# Patient Record
Sex: FEMALE | Race: OTHER | Employment: UNEMPLOYED | URBAN - METROPOLITAN AREA
[De-identification: names, ages, dates, MRNs, and addresses within clinical notes are randomized per-mention and may not be internally consistent; named-entity substitution may affect disease eponyms.]

---

## 2017-06-23 ENCOUNTER — LAB ENCOUNTER (OUTPATIENT)
Dept: LAB | Age: 47
End: 2017-06-23
Attending: FAMILY MEDICINE
Payer: COMMERCIAL

## 2017-06-23 ENCOUNTER — OFFICE VISIT (OUTPATIENT)
Dept: INTERNAL MEDICINE CLINIC | Facility: CLINIC | Age: 47
End: 2017-06-23

## 2017-06-23 VITALS
RESPIRATION RATE: 16 BRPM | SYSTOLIC BLOOD PRESSURE: 106 MMHG | DIASTOLIC BLOOD PRESSURE: 74 MMHG | WEIGHT: 139.25 LBS | HEIGHT: 61 IN | TEMPERATURE: 98 F | HEART RATE: 82 BPM | OXYGEN SATURATION: 98 % | BODY MASS INDEX: 26.29 KG/M2

## 2017-06-23 DIAGNOSIS — Z00.00 LABORATORY EXAM ORDERED AS PART OF ROUTINE GENERAL MEDICAL EXAMINATION: ICD-10-CM

## 2017-06-23 DIAGNOSIS — F41.0 PANIC ATTACK: ICD-10-CM

## 2017-06-23 DIAGNOSIS — K21.9 GASTROESOPHAGEAL REFLUX DISEASE, ESOPHAGITIS PRESENCE NOT SPECIFIED: Primary | ICD-10-CM

## 2017-06-23 PROCEDURE — 85025 COMPLETE CBC W/AUTO DIFF WBC: CPT

## 2017-06-23 PROCEDURE — 36415 COLL VENOUS BLD VENIPUNCTURE: CPT

## 2017-06-23 PROCEDURE — 99214 OFFICE O/P EST MOD 30 MIN: CPT | Performed by: PHYSICIAN ASSISTANT

## 2017-06-23 PROCEDURE — 84443 ASSAY THYROID STIM HORMONE: CPT

## 2017-06-23 PROCEDURE — 83036 HEMOGLOBIN GLYCOSYLATED A1C: CPT

## 2017-06-23 PROCEDURE — 80061 LIPID PANEL: CPT

## 2017-06-23 PROCEDURE — 80048 BASIC METABOLIC PNL TOTAL CA: CPT

## 2017-06-23 RX ORDER — OMEPRAZOLE 20 MG/1
20 CAPSULE, DELAYED RELEASE ORAL
COMMUNITY
End: 2017-08-09

## 2017-06-23 NOTE — PROGRESS NOTES
Vanessa Hernandez is a 50year old female. HPI:   Patient presents for heartburn. Has been on omeprazole for several years - previously taking on PRN basis. Began taking daily six months ago but is still having symptoms.   Describes midsternal chest bu Delgado    OTHER SURGICAL HISTORY  6/2016    Comment L eye pterygium excision        Smoking Status: Never Smoker                      Alcohol Use: Yes                Comment: occ      Family History   Problem Relation Age of Onset   • Other[other] [OTHER] Fat

## 2017-06-23 NOTE — PATIENT INSTRUCTIONS
Heartburn:  - continue omeprazole or prilosec  - start Zantac (ranitidine) 150 mg - take 1 tablet twice a day  - continue to watch your diet  - avoid eating within 2 hours of laying down or going to bed  - follow up with Dr. Jeffery Hylton:  - Pablito quiles

## 2018-01-05 ENCOUNTER — OFFICE VISIT (OUTPATIENT)
Dept: INTERNAL MEDICINE CLINIC | Facility: CLINIC | Age: 48
End: 2018-01-05

## 2018-01-05 VITALS
WEIGHT: 145.75 LBS | SYSTOLIC BLOOD PRESSURE: 100 MMHG | HEART RATE: 92 BPM | BODY MASS INDEX: 28.61 KG/M2 | TEMPERATURE: 98 F | HEIGHT: 60 IN | OXYGEN SATURATION: 98 % | RESPIRATION RATE: 16 BRPM | DIASTOLIC BLOOD PRESSURE: 70 MMHG

## 2018-01-05 DIAGNOSIS — K21.9 GASTROESOPHAGEAL REFLUX DISEASE, ESOPHAGITIS PRESENCE NOT SPECIFIED: ICD-10-CM

## 2018-01-05 DIAGNOSIS — G56.03 BILATERAL CARPAL TUNNEL SYNDROME: Primary | ICD-10-CM

## 2018-01-05 PROCEDURE — 90686 IIV4 VACC NO PRSV 0.5 ML IM: CPT | Performed by: PHYSICIAN ASSISTANT

## 2018-01-05 PROCEDURE — 90471 IMMUNIZATION ADMIN: CPT | Performed by: PHYSICIAN ASSISTANT

## 2018-01-05 PROCEDURE — 99214 OFFICE O/P EST MOD 30 MIN: CPT | Performed by: PHYSICIAN ASSISTANT

## 2018-01-05 NOTE — PATIENT INSTRUCTIONS
Please see Gloria Quinones in [place] for your wellness exam.  Carpal Tunnel Syndrome Prevention Tips  - try a wrist splint at night time (you can get this at the pharmacy)  - follow up with Dr. Supa Pabon if no improvement  Some repetitive hand activities put you at high

## 2018-01-05 NOTE — PROGRESS NOTES
Terri Hein is a 50year old female. HPI:   Patient presents for eval of numbness and tingling in bilat hands x 1 month. Occurs only at night. Both hands, all five fingers. Sometimes occurs when scrubbing dishes.   Also c/o fatigue throughout th rashes  GI: denies abd pain, nausea, diarrhea, constipation, heartburn, melena, BRBPR  NEURO: per HPI  MUSCULOSKELETAL: denies other joint pain or myalgias    EXAM:   /70   Pulse 92   Temp 98.2 °F (36.8 °C) (Oral)   Resp 16   Ht 60\"   Wt 145 lb 12 o

## 2018-03-23 ENCOUNTER — OFFICE VISIT (OUTPATIENT)
Dept: INTERNAL MEDICINE CLINIC | Facility: CLINIC | Age: 48
End: 2018-03-23

## 2018-03-23 VITALS
OXYGEN SATURATION: 98 % | TEMPERATURE: 98 F | BODY MASS INDEX: 27.4 KG/M2 | DIASTOLIC BLOOD PRESSURE: 68 MMHG | HEIGHT: 60.5 IN | HEART RATE: 77 BPM | WEIGHT: 143.25 LBS | SYSTOLIC BLOOD PRESSURE: 106 MMHG

## 2018-03-23 DIAGNOSIS — Z00.00 ANNUAL PHYSICAL EXAM: Primary | ICD-10-CM

## 2018-03-23 DIAGNOSIS — N94.10 DYSPAREUNIA, FEMALE: ICD-10-CM

## 2018-03-23 DIAGNOSIS — F41.8 SITUATIONAL ANXIETY: ICD-10-CM

## 2018-03-23 DIAGNOSIS — R10.31 RLQ ABDOMINAL PAIN: ICD-10-CM

## 2018-03-23 DIAGNOSIS — Z12.39 SCREENING FOR MALIGNANT NEOPLASM OF BREAST: ICD-10-CM

## 2018-03-23 DIAGNOSIS — K21.9 GASTROESOPHAGEAL REFLUX DISEASE, ESOPHAGITIS PRESENCE NOT SPECIFIED: ICD-10-CM

## 2018-03-23 DIAGNOSIS — R30.0 DYSURIA: ICD-10-CM

## 2018-03-23 LAB
APPEARANCE: CLEAR
BILIRUBIN: NEGATIVE
GLUCOSE (URINE DIPSTICK): NEGATIVE MG/DL
KETONES (URINE DIPSTICK): NEGATIVE MG/DL
MULTISTIX LOT#: NORMAL NUMERIC
NITRITE, URINE: NEGATIVE
OCCULT BLOOD: NEGATIVE
PH, URINE: 7 (ref 4.5–8)
PROTEIN (URINE DIPSTICK): NEGATIVE MG/DL
SPECIFIC GRAVITY: 1.02 (ref 1–1.03)
URINE-COLOR: YELLOW
UROBILINOGEN,SEMI-QN: 0.2 MG/DL (ref 0–1.9)

## 2018-03-23 PROCEDURE — 99396 PREV VISIT EST AGE 40-64: CPT | Performed by: PHYSICIAN ASSISTANT

## 2018-03-23 PROCEDURE — 87086 URINE CULTURE/COLONY COUNT: CPT | Performed by: PHYSICIAN ASSISTANT

## 2018-03-23 PROCEDURE — 81003 URINALYSIS AUTO W/O SCOPE: CPT | Performed by: PHYSICIAN ASSISTANT

## 2018-03-23 PROCEDURE — 99214 OFFICE O/P EST MOD 30 MIN: CPT | Performed by: PHYSICIAN ASSISTANT

## 2018-03-23 NOTE — PATIENT INSTRUCTIONS
Burning with Urination:  - increase water intake  - avoid bladder irritants (caffeine, alcohol, citrus fruits/juices, spicy foods, artificial sweeteners)    Please call Thanh Garcia at [phone] to set up the following tests:  - pe

## 2018-03-23 NOTE — PROGRESS NOTES
Milla Archer is a 46year old female who presents for a complete physical exam.   HPI:   Pt presents for annual wellness screening. Would also like to discuss painful intercourse for the past few months.   Aware she may incur separate charges for this polyps hyperplastic, repeat 2025  No date: COLONOSCOPY  5/27/2015: COLONOSCOPY,BIOPSY N/A      Comment: Procedure: ESOPHAGOGASTRODUODENOSCOPY,                COLONOSCOPY, POSSIBLE BIOPSY, POSSIBLE                POLYPECTOMY 55982,06303;  Surgeon: hematuria  MUSCULOSKELETAL: no arthralgias  NEURO: denies headaches, numbness, tingling, weakness  BREAST: no pain, discharge, or lumps  HEMATOLOGIC: denies easy bruising or bleeding  LYMPH: denies swollen lymph nodes  ENDOCRINE: denies hot/cold intoleranc no improvement. Health Maint:  # Breast CA screening: normal MMG 3/2016 - new order given. # Cervical CA screening: normal pap and HPV neg 3/2016 - repeat 8814-6644. # Colon CA screening: c-scope done 5/27/15 - hyperplastic polyps - repeat in 2025.   #

## 2018-04-03 ENCOUNTER — HOSPITAL ENCOUNTER (OUTPATIENT)
Dept: ULTRASOUND IMAGING | Age: 48
Discharge: HOME | End: 2018-04-03
Attending: PHYSICIAN ASSISTANT
Payer: COMMERCIAL

## 2018-04-03 ENCOUNTER — HOSPITAL ENCOUNTER (OUTPATIENT)
Dept: MAMMOGRAPHY | Age: 48
Discharge: HOME | End: 2018-04-03
Attending: PHYSICIAN ASSISTANT
Payer: COMMERCIAL

## 2018-04-03 DIAGNOSIS — R10.31 RLQ ABDOMINAL PAIN: ICD-10-CM

## 2018-04-03 DIAGNOSIS — Z12.39 SCREENING FOR MALIGNANT NEOPLASM OF BREAST: ICD-10-CM

## 2018-04-03 DIAGNOSIS — Z00.00 ANNUAL PHYSICAL EXAM: ICD-10-CM

## 2018-04-03 DIAGNOSIS — N94.10 DYSPAREUNIA, FEMALE: ICD-10-CM

## 2018-04-03 DIAGNOSIS — R30.0 DYSURIA: ICD-10-CM

## 2018-04-03 PROCEDURE — 76856 US EXAM PELVIC COMPLETE: CPT | Performed by: PHYSICIAN ASSISTANT

## 2018-04-03 PROCEDURE — 77063 BREAST TOMOSYNTHESIS BI: CPT | Performed by: PHYSICIAN ASSISTANT

## 2018-04-03 PROCEDURE — 77067 SCR MAMMO BI INCL CAD: CPT | Performed by: PHYSICIAN ASSISTANT

## 2018-04-03 PROCEDURE — 76830 TRANSVAGINAL US NON-OB: CPT | Performed by: PHYSICIAN ASSISTANT

## 2018-04-24 ENCOUNTER — HOSPITAL ENCOUNTER (OUTPATIENT)
Facility: HOSPITAL | Age: 48
Setting detail: HOSPITAL OUTPATIENT SURGERY
Discharge: HOME | End: 2018-04-24
Attending: INTERNAL MEDICINE | Admitting: INTERNAL MEDICINE
Payer: COMMERCIAL

## 2018-04-24 ENCOUNTER — SURGERY (OUTPATIENT)
Age: 48
End: 2018-04-24

## 2018-04-24 ENCOUNTER — ANESTHESIA (OUTPATIENT)
Dept: ENDOSCOPY | Facility: HOSPITAL | Age: 48
End: 2018-04-24
Payer: COMMERCIAL

## 2018-04-24 ENCOUNTER — ANESTHESIA EVENT (OUTPATIENT)
Dept: ENDOSCOPY | Facility: HOSPITAL | Age: 48
End: 2018-04-24
Payer: COMMERCIAL

## 2018-04-24 VITALS
OXYGEN SATURATION: 100 % | SYSTOLIC BLOOD PRESSURE: 127 MMHG | DIASTOLIC BLOOD PRESSURE: 67 MMHG | HEART RATE: 71 BPM | WEIGHT: 140 LBS | TEMPERATURE: 98 F | HEIGHT: 61 IN | BODY MASS INDEX: 26.43 KG/M2 | RESPIRATION RATE: 18 BRPM

## 2018-04-24 DIAGNOSIS — K21.9 GASTROESOPHAGEAL REFLUX DISEASE, ESOPHAGITIS PRESENCE NOT SPECIFIED: ICD-10-CM

## 2018-04-24 PROCEDURE — 0DJ08ZZ INSPECTION OF UPPER INTESTINAL TRACT, VIA NATURAL OR ARTIFICIAL OPENING ENDOSCOPIC: ICD-10-PCS | Performed by: INTERNAL MEDICINE

## 2018-04-24 RX ORDER — SODIUM CHLORIDE, SODIUM LACTATE, POTASSIUM CHLORIDE, CALCIUM CHLORIDE 600; 310; 30; 20 MG/100ML; MG/100ML; MG/100ML; MG/100ML
INJECTION, SOLUTION INTRAVENOUS CONTINUOUS
Status: DISCONTINUED | OUTPATIENT
Start: 2018-04-24 | End: 2018-04-24

## 2018-04-24 RX ORDER — NALOXONE HYDROCHLORIDE 0.4 MG/ML
80 INJECTION, SOLUTION INTRAMUSCULAR; INTRAVENOUS; SUBCUTANEOUS AS NEEDED
Status: DISCONTINUED | OUTPATIENT
Start: 2018-04-24 | End: 2018-04-24

## 2018-04-24 NOTE — BRIEF OP NOTE
Pre-Operative Diagnosis: Gastroesophageal reflux disease, esophagitis presence not specified [K21.9]     Post-Operative Diagnosis: * No post-op diagnosis entered *      Procedure Performed:   Procedure(s):  ESOPHAGOGASTRODUODENOSCOPY WITH 96 HOUR BRAVO

## 2018-04-24 NOTE — OPERATIVE REPORT
[facility] GI OPERATIVE REPORT   PATIENT NAME: Cranford Sicard  MRN: [id number]  DATE OF OPERATION: 4/24/2018  PREOPERATIVE DIAGNOSIS:   1. GERD  2. Non cardiac chest pain   3.  Exam performed off of PPI therapy with plan for 96 hours study (48 hours given a written copy of their results at discharge. FINDINGS:  1. Normal esophagus with no evidence of esophagitis, stricture, ulceration, mass or other abnormalities. The squamocolumnar junction was intact and located  35 cm from the incisors.  The esop

## 2018-04-24 NOTE — H&P
GI PRE-OP H&P    CC: GERD, non cardiac chest pain    HPI:  Jules Campoverde is a 46year old female who is here for EGD, BRAVO for above indications    Past Medical History:   Diagnosis Date   • Esophageal reflux    • Reflux    • Situational anxiety 3/23/2 smoker  Alcohol use:  Yes              Comment: social      REVIEW OF SYSTEMS:  CONSTITUTIONAL: denies fevers, weight loss  LUNGS: denies shortness of breath  CARDIOVASCULAR: denies chest pain  GI: as above      EXAM:  /75   Pulse 69   Temp 97.9 °F (3

## 2018-04-24 NOTE — ANESTHESIA PREPROCEDURE EVALUATION
PRE-OP EVALUATION    Patient Name: Elio Chiu    Pre-op Diagnosis: Gastroesophageal reflux disease, esophagitis presence not specified [K21.9]    Procedure(s):  ESOPHAGOGASTRODUODENOSCOPY WITH 96 HOUR BRAVO     Surgeon(s) and Role:     * Cassy, And ESOPHAGOGASTRODUODENOSCOPY,                COLONOSCOPY, POSSIBLE BIOPSY, POSSIBLE                POLYPECTOMY 50081,28315;  Surgeon: Tung Stephenson MD;  Location: [address] [place] [address] date: [facility]      Comment: x2  6

## 2018-04-24 NOTE — ANESTHESIA POSTPROCEDURE EVALUATION
[facility] Patient Status:  Hospital Outpatient Surgery   Age/Gender 46year old female MRN [id number]   Location [facility]. Attending Jamia Chandler MD   Hosp Day # 0 PCP Gold Hernandez MD       Anesthesia Post-op

## 2018-07-18 ENCOUNTER — OFFICE VISIT (OUTPATIENT)
Dept: INTERNAL MEDICINE CLINIC | Facility: CLINIC | Age: 48
End: 2018-07-18

## 2018-07-18 VITALS
RESPIRATION RATE: 16 BRPM | TEMPERATURE: 98 F | DIASTOLIC BLOOD PRESSURE: 68 MMHG | HEART RATE: 76 BPM | WEIGHT: 147.25 LBS | SYSTOLIC BLOOD PRESSURE: 104 MMHG | HEIGHT: 60.5 IN | BODY MASS INDEX: 28.16 KG/M2

## 2018-07-18 DIAGNOSIS — N95.1 HOT FLASH, MENOPAUSAL: ICD-10-CM

## 2018-07-18 DIAGNOSIS — E66.3 OVERWEIGHT (BMI 25.0-29.9): ICD-10-CM

## 2018-07-18 DIAGNOSIS — F41.8 SITUATIONAL ANXIETY: ICD-10-CM

## 2018-07-18 DIAGNOSIS — R63.5 WEIGHT GAIN: Primary | ICD-10-CM

## 2018-07-18 DIAGNOSIS — K21.9 GASTROESOPHAGEAL REFLUX DISEASE, ESOPHAGITIS PRESENCE NOT SPECIFIED: ICD-10-CM

## 2018-07-18 PROCEDURE — 99214 OFFICE O/P EST MOD 30 MIN: CPT | Performed by: PHYSICIAN ASSISTANT

## 2018-07-18 RX ORDER — PAROXETINE 10 MG/1
10 TABLET, FILM COATED ORAL EVERY MORNING
Qty: 30 TABLET | Refills: 0 | Status: SHIPPED | OUTPATIENT
Start: 2018-07-18 | End: 2019-01-02

## 2018-07-18 NOTE — PATIENT INSTRUCTIONS
Please do your lab work ASAP. Remember to fast for 10-12 hours but drink plenty of water. Hot Flashes:  - start paroxetine 10 mg - 1 tablet daily    Sleep:  Please do the followin.  Please go to bed at the same time every night and wake-up at the s

## 2018-07-18 NOTE — PROGRESS NOTES
Sofy Seymour is a 46year old female. HPI:   Patient presents for weight gain. Up 10-12 lbs in past year.   Diet - eggs, beans, tortillas, coffee for breakfast, usually skips lunch (sometimes snacks on fruit), chicken, soup, rice, tortillas, potato Comment: gastritis, neg for HP  5/27/2015: UPPER GI ENDOSCOPY,BIOPSY N/A      Comment: Procedure: ESOPHAGOGASTRODUODENOSCOPY,                COLONOSCOPY, POSSIBLE BIOPSY, POSSIBLE                POLYPECTOMY 96676,61117;  Surgeon: Poornima Springer affect, normal judgement and insight    ASSESSMENT AND PLAN:   # Weight gain, overweight: check labs. Counseled on lifestyle changes. # Menopausal hot flashes: start paroxetine 10 mg daily.   # Panic attack, situational anxiety: working with Cesar Motley

## 2018-07-20 ENCOUNTER — APPOINTMENT (OUTPATIENT)
Dept: LAB | Age: 48
End: 2018-07-20
Attending: PHYSICIAN ASSISTANT
Payer: COMMERCIAL

## 2018-07-20 DIAGNOSIS — Z00.00 ANNUAL PHYSICAL EXAM: ICD-10-CM

## 2018-07-20 LAB
ALT SERPL-CCNC: 36 U/L (ref 14–54)
ANION GAP SERPL CALC-SCNC: 8 MMOL/L (ref 0–18)
AST SERPL-CCNC: 25 U/L (ref 15–41)
BUN BLD-MCNC: 8 MG/DL (ref 8–20)
BUN/CREAT SERPL: 14.5 (ref 10–20)
CALCIUM BLD-MCNC: 9.2 MG/DL (ref 8.3–10.3)
CHLORIDE SERPL-SCNC: 106 MMOL/L (ref 101–111)
CHOLEST SMN-MCNC: 242 MG/DL (ref ?–200)
CO2 SERPL-SCNC: 27 MMOL/L (ref 22–32)
CREAT BLD-MCNC: 0.55 MG/DL (ref 0.55–1.02)
EST. AVERAGE GLUCOSE BLD GHB EST-MCNC: 108 MG/DL (ref 68–126)
GLUCOSE BLD-MCNC: 83 MG/DL (ref 70–99)
HBA1C MFR BLD HPLC: 5.4 % (ref ?–5.7)
HDLC SERPL-MCNC: 64 MG/DL (ref 45–?)
HDLC SERPL: 3.78 {RATIO} (ref ?–4.44)
LDLC SERPL CALC-MCNC: 150 MG/DL (ref ?–130)
NONHDLC SERPL-MCNC: 178 MG/DL (ref ?–130)
OSMOLALITY SERPL CALC.SUM OF ELEC: 289 MOSM/KG (ref 275–295)
POTASSIUM SERPL-SCNC: 4.7 MMOL/L (ref 3.6–5.1)
SODIUM SERPL-SCNC: 141 MMOL/L (ref 136–144)
TRIGL SERPL-MCNC: 142 MG/DL (ref ?–150)
TSI SER-ACNC: 1.78 MIU/ML (ref 0.35–5.5)
VLDLC SERPL CALC-MCNC: 28 MG/DL (ref 5–40)

## 2018-07-20 PROCEDURE — 80048 BASIC METABOLIC PNL TOTAL CA: CPT

## 2018-07-20 PROCEDURE — 84443 ASSAY THYROID STIM HORMONE: CPT

## 2018-07-20 PROCEDURE — 84450 TRANSFERASE (AST) (SGOT): CPT

## 2018-07-20 PROCEDURE — 36415 COLL VENOUS BLD VENIPUNCTURE: CPT

## 2018-07-20 PROCEDURE — 80061 LIPID PANEL: CPT

## 2018-07-20 PROCEDURE — 83036 HEMOGLOBIN GLYCOSYLATED A1C: CPT

## 2018-07-20 PROCEDURE — 84460 ALANINE AMINO (ALT) (SGPT): CPT

## 2018-12-31 ENCOUNTER — HOSPITAL ENCOUNTER (OUTPATIENT)
Age: 48
Discharge: HOME | End: 2018-12-31
Payer: COMMERCIAL

## 2018-12-31 ENCOUNTER — APPOINTMENT (OUTPATIENT)
Dept: GENERAL RADIOLOGY | Age: 48
End: 2018-12-31
Attending: PHYSICIAN ASSISTANT
Payer: COMMERCIAL

## 2018-12-31 ENCOUNTER — TELEPHONE (OUTPATIENT)
Dept: INTERNAL MEDICINE CLINIC | Facility: CLINIC | Age: 48
End: 2018-12-31

## 2018-12-31 VITALS
OXYGEN SATURATION: 99 % | RESPIRATION RATE: 20 BRPM | TEMPERATURE: 98 F | SYSTOLIC BLOOD PRESSURE: 114 MMHG | DIASTOLIC BLOOD PRESSURE: 74 MMHG | HEART RATE: 84 BPM

## 2018-12-31 DIAGNOSIS — F43.9 STRESS: ICD-10-CM

## 2018-12-31 DIAGNOSIS — R42 DIZZINESS: Primary | ICD-10-CM

## 2018-12-31 DIAGNOSIS — H81.10 BENIGN PAROXYSMAL POSITIONAL VERTIGO, UNSPECIFIED LATERALITY: ICD-10-CM

## 2018-12-31 LAB
#LYMPHOCYTE IC: 2.7 X10ˆ3/UL (ref 0.9–3.2)
#MXD IC: 0.5 X10ˆ3/UL (ref 0.1–1)
#NEUTROPHIL IC: 2 X10ˆ3/UL (ref 1.3–6.7)
CREAT SERPL-MCNC: 0.5 MG/DL (ref 0.55–1.02)
GLUCOSE BLD-MCNC: 92 MG/DL (ref 70–99)
HCT IC: 39.3 % (ref 37–54)
HGB IC: 13.1 G/DL (ref 11.7–16)
ISTAT BUN: 10 MG/DL (ref 8–20)
ISTAT CHLORIDE: 106 MMOL/L (ref 101–111)
ISTAT HEMATOCRIT: 39 % (ref 34–50)
ISTAT IONIZED CALCIUM: 1.19 MMOL/L
ISTAT POTASSIUM: 3.7 MMOL/L (ref 3.6–5.1)
ISTAT SODIUM: 143 MMOL/L (ref 136–144)
ISTAT TROPONIN: <0.1 NG/ML (ref ?–0.1)
LYMPHOCYTES NFR BLD AUTO: 51.2 %
MCH IC: 31.9 PG (ref 27–33.2)
MCHC IC: 33.3 G/DL (ref 31–37)
MCV IC: 95.6 FL (ref 81–100)
MIXED CELL %: 9.8 %
NEUTROPHILS NFR BLD AUTO: 39 %
PLT IC: 241 X10ˆ3/UL (ref 150–450)
RBC IC: 4.11 X10ˆ6/UL (ref 3.8–5.1)
WBC IC: 5.2 X10ˆ3/UL (ref 4–13)

## 2018-12-31 PROCEDURE — 71046 X-RAY EXAM CHEST 2 VIEWS: CPT | Performed by: PHYSICIAN ASSISTANT

## 2018-12-31 PROCEDURE — 80047 BASIC METABLC PNL IONIZED CA: CPT

## 2018-12-31 PROCEDURE — 99215 OFFICE O/P EST HI 40 MIN: CPT

## 2018-12-31 PROCEDURE — 85025 COMPLETE CBC W/AUTO DIFF WBC: CPT | Performed by: PHYSICIAN ASSISTANT

## 2018-12-31 PROCEDURE — 84484 ASSAY OF TROPONIN QUANT: CPT

## 2018-12-31 PROCEDURE — 93005 ELECTROCARDIOGRAM TRACING: CPT

## 2018-12-31 PROCEDURE — 36415 COLL VENOUS BLD VENIPUNCTURE: CPT

## 2018-12-31 PROCEDURE — 99205 OFFICE O/P NEW HI 60 MIN: CPT

## 2018-12-31 PROCEDURE — 93010 ELECTROCARDIOGRAM REPORT: CPT

## 2018-12-31 RX ORDER — MECLIZINE HCL 12.5 MG/1
25 TABLET ORAL ONCE
Status: COMPLETED | OUTPATIENT
Start: 2018-12-31 | End: 2018-12-31

## 2018-12-31 RX ORDER — MECLIZINE HCL 12.5 MG/1
25 TABLET ORAL 3 TIMES DAILY PRN
Qty: 30 TABLET | Refills: 0 | Status: SHIPPED | OUTPATIENT
Start: 2018-12-31 | End: 2019-01-02

## 2018-12-31 NOTE — TELEPHONE ENCOUNTER
Pt called c/o dizziness on and off since Saturday. No c/o nausea, vomiting, visual changes or ear congestion. Pt instructed to seek eval in UC today. Pt verbalizes understanding.    Pt declined to cancel appointment on 1/2 at this time just incase she wi

## 2018-12-31 NOTE — ED PROVIDER NOTES
Patient Seen in: [facility]    History   Patient presents with:  Dizziness    Stated Complaint: dizzy x since saturday     HPI    47-year-old female here with complaint of feeling dizzy on and off times 4-5 days.   Patient report noncontributory to the presenting problem, except as indicated as above.   Social History    Tobacco Use      Smoking status: Former Smoker        Types: Cigarettes      Smokeless tobacco: Never Used      Tobacco comment: quit 25 years ago--social smoker ISTAT Creatinine 0.50 (*)     All other components within normal limits   ISTAT TROPONIN - Normal   POCT CBC     EKG    Rate, intervals and axes as noted on EKG Report.   Rate:82 BPM  Rhythm: Normal sinus rhythm  Reading: Normal EKG         Xr Chest Pa + La Medications Prescribed:  Current Discharge Medication List    START taking these medications    Meclizine HCl 12.5 MG Oral Tab  Take 2 tablets (25 mg total) by mouth 3 (three) times daily as needed.   Qty: 30 tablet Refills: 0

## 2018-12-31 NOTE — TELEPHONE ENCOUNTER
Pt has been dizzy (possible vertigo) and been bed since Saturday. Pt stated this dizziness started one day last week but went away and came back on Saturday. Pt is scheduled for 1/2/19 but would like to discuss what to do until the appt.

## 2019-01-02 ENCOUNTER — OFFICE VISIT (OUTPATIENT)
Dept: INTERNAL MEDICINE CLINIC | Facility: CLINIC | Age: 49
End: 2019-01-02

## 2019-01-02 VITALS
TEMPERATURE: 98 F | BODY MASS INDEX: 28.5 KG/M2 | DIASTOLIC BLOOD PRESSURE: 62 MMHG | OXYGEN SATURATION: 97 % | HEART RATE: 77 BPM | HEIGHT: 60.5 IN | WEIGHT: 149 LBS | SYSTOLIC BLOOD PRESSURE: 122 MMHG | RESPIRATION RATE: 14 BRPM

## 2019-01-02 DIAGNOSIS — R42 VERTIGO: Primary | ICD-10-CM

## 2019-01-02 DIAGNOSIS — K21.9 GASTROESOPHAGEAL REFLUX DISEASE, ESOPHAGITIS PRESENCE NOT SPECIFIED: ICD-10-CM

## 2019-01-02 DIAGNOSIS — N95.1 HOT FLASH, MENOPAUSAL: ICD-10-CM

## 2019-01-02 DIAGNOSIS — F41.8 SITUATIONAL ANXIETY: ICD-10-CM

## 2019-01-02 LAB
ATRIAL RATE: 82 BPM
P AXIS: 2 DEGREES
P-R INTERVAL: 154 MS
Q-T INTERVAL: 378 MS
QRS DURATION: 92 MS
QTC CALCULATION (BEZET): 441 MS
R AXIS: 70 DEGREES
T AXIS: 60 DEGREES
VENTRICULAR RATE: 82 BPM

## 2019-01-02 PROCEDURE — 99214 OFFICE O/P EST MOD 30 MIN: CPT | Performed by: PHYSICIAN ASSISTANT

## 2019-01-02 RX ORDER — MECLIZINE HCL 12.5 MG/1
25 TABLET ORAL 3 TIMES DAILY PRN
Qty: 60 TABLET | Refills: 0 | Status: SHIPPED | OUTPATIENT
Start: 2019-01-02 | End: 2019-01-12

## 2019-01-02 NOTE — PROGRESS NOTES
HPI:  Eleanor Valenzuela is a 46year old female who presents for f/u from [facility] visit on 12/31/18. Seen for dizziness, dx with vertigo. Has had intermittent episodes of dizziness -- feeling like the room is spinning.   Worse when turning her head quickly and Cigarettes      Smokeless tobacco: Never Used      Tobacco comment: quit 25 years ago--social smoker    Alcohol use: Yes      Comment: social    Drug use: No       ROS:  GENERAL HEALTH: denies fever, chills, night sweats  SKIN: denies any unusual skin lesi exercise. # Vaccines: rec yearly flu shot, TdaP done 2015. The patient indicates understanding of these issues and agrees to the plan. The patient is asked to return here in July for wellness visit.

## 2019-01-02 NOTE — PATIENT INSTRUCTIONS
Dizziness / Vertigo:  - continue meclizine as needed  - start home exercises  - change positions and turn your head very slowly  Dizziness is a common symptom. It may be described as lightheadedness, spinning, or feeling like you are going to faint.  Pauly Beaulieu · Vision or hearing changes    Follow up visit with Antonieta Warner in July for your wellness exam.  · Trouble walking or speaking  · Chest, arm, neck, back, or jaw pain  Date Last Reviewed: 11/1/2017  © [id number] [employer] [address].  [facility],

## 2019-01-14 ENCOUNTER — OFFICE VISIT (OUTPATIENT)
Dept: INTERNAL MEDICINE CLINIC | Facility: CLINIC | Age: 49
End: 2019-01-14

## 2019-01-14 VITALS
BODY MASS INDEX: 28.98 KG/M2 | RESPIRATION RATE: 16 BRPM | OXYGEN SATURATION: 99 % | HEIGHT: 60.5 IN | HEART RATE: 95 BPM | DIASTOLIC BLOOD PRESSURE: 76 MMHG | TEMPERATURE: 98 F | SYSTOLIC BLOOD PRESSURE: 114 MMHG | WEIGHT: 151.5 LBS

## 2019-01-14 DIAGNOSIS — H53.8 BLURRED VISION: ICD-10-CM

## 2019-01-14 DIAGNOSIS — R51.9 FRONTAL HEADACHE: ICD-10-CM

## 2019-01-14 DIAGNOSIS — R42 DIZZINESS: Primary | ICD-10-CM

## 2019-01-14 PROCEDURE — 99214 OFFICE O/P EST MOD 30 MIN: CPT | Performed by: PHYSICIAN ASSISTANT

## 2019-01-14 RX ORDER — MECLIZINE HCL 12.5 MG/1
25 TABLET ORAL 3 TIMES DAILY PRN
COMMUNITY
End: 2019-05-31 | Stop reason: ALTCHOICE

## 2019-01-14 NOTE — PROGRESS NOTES
HPI:  Jules Campoverde is a 46year old female who presents for ongoing episodes of dizziness which began two weeks ago. C/o sensation that the room is spinning - worse when looking up, turning head quickly, or laying down. C/o blurred vision.   Has had a HEALTH: denies fever, chills, night sweats  SKIN: denies any unusual skin lesions or rashes  HEENT: denies sore throat, nasal congestion or drainage, ear pain  RESPIRATORY: per HPI  CARDIOVASCULAR: per HPI  GI: denies abdominal pain, nausea, vomiting, diar screening: counseled on dietary ca, vit d, regular WB exercise. # Vaccines: rec yearly flu shot, TdaP done 2015. The patient indicates understanding of these issues and agrees to the plan.   The patient is asked to return here in July for wellness visit

## 2019-01-14 NOTE — PATIENT INSTRUCTIONS
Please call Thanh Garcia at [phone] to set up the following tests:  - MRI brain    Headache:  - may take tylenol (acetaminophen) 1,000 mg every 8 hours as needed (do not exceed 4,000 mg daily)  - may take ibuprofen 600 mg every 8

## 2019-01-18 ENCOUNTER — HOSPITAL ENCOUNTER (OUTPATIENT)
Dept: MRI IMAGING | Age: 49
Discharge: HOME | End: 2019-01-18
Attending: PHYSICIAN ASSISTANT
Payer: COMMERCIAL

## 2019-01-18 DIAGNOSIS — R42 DIZZINESS: ICD-10-CM

## 2019-01-18 DIAGNOSIS — R51.9 FRONTAL HEADACHE: ICD-10-CM

## 2019-01-18 DIAGNOSIS — H53.8 BLURRED VISION: ICD-10-CM

## 2019-01-18 PROCEDURE — A9575 INJ GADOTERATE MEGLUMI 0.1ML: HCPCS | Performed by: PHYSICIAN ASSISTANT

## 2019-01-18 PROCEDURE — 70553 MRI BRAIN STEM W/O & W/DYE: CPT | Performed by: PHYSICIAN ASSISTANT

## 2019-05-31 ENCOUNTER — OFFICE VISIT (OUTPATIENT)
Dept: INTERNAL MEDICINE CLINIC | Facility: CLINIC | Age: 49
End: 2019-05-31

## 2019-05-31 VITALS
HEIGHT: 60.5 IN | HEART RATE: 80 BPM | WEIGHT: 141 LBS | OXYGEN SATURATION: 98 % | SYSTOLIC BLOOD PRESSURE: 122 MMHG | BODY MASS INDEX: 26.97 KG/M2 | RESPIRATION RATE: 16 BRPM | TEMPERATURE: 98 F | DIASTOLIC BLOOD PRESSURE: 70 MMHG

## 2019-05-31 DIAGNOSIS — J01.90 ACUTE NON-RECURRENT SINUSITIS, UNSPECIFIED LOCATION: Primary | ICD-10-CM

## 2019-05-31 PROCEDURE — 99213 OFFICE O/P EST LOW 20 MIN: CPT | Performed by: INTERNAL MEDICINE

## 2019-05-31 RX ORDER — AZITHROMYCIN 250 MG/1
TABLET, FILM COATED ORAL
Qty: 6 TABLET | Refills: 0 | Status: SHIPPED | OUTPATIENT
Start: 2019-05-31 | End: 2019-11-26 | Stop reason: ALTCHOICE

## 2019-05-31 NOTE — PROGRESS NOTES
Patient presents with: Other: possible sinus infection      HPI: Doroteo Abbott presents today for constellation of symptoms including sinus pressure, congestion, rhinorrhea all for about 1 week. Has tried various OTC remedies w/o significant relief.   Here for understands the plan as outlined above. He was also afforded the time and opportunity to ask questions, which were then answered to the best of my ability. Tod Tolentino. Jenn Pelaez MD  Diplomate, American Board of Internal Medicine  [address] [address] [address]

## 2019-06-04 NOTE — TELEPHONE ENCOUNTER
Pt requesting a refill on proctosol hc which was RX in 2016. She states she has hemorrhoids and usually takes fiber to help keep her BM's regular but forgot so for the last 2 days has been experiencing constipation.      Has been straining to have BM's

## 2019-11-26 ENCOUNTER — OFFICE VISIT (OUTPATIENT)
Dept: INTERNAL MEDICINE CLINIC | Facility: CLINIC | Age: 49
End: 2019-11-26

## 2019-11-26 VITALS
HEIGHT: 60.5 IN | HEART RATE: 90 BPM | BODY MASS INDEX: 25.34 KG/M2 | RESPIRATION RATE: 16 BRPM | WEIGHT: 132.5 LBS | SYSTOLIC BLOOD PRESSURE: 100 MMHG | TEMPERATURE: 98 F | DIASTOLIC BLOOD PRESSURE: 70 MMHG | OXYGEN SATURATION: 99 %

## 2019-11-26 DIAGNOSIS — K21.9 GASTROESOPHAGEAL REFLUX DISEASE, ESOPHAGITIS PRESENCE NOT SPECIFIED: Primary | ICD-10-CM

## 2019-11-26 PROCEDURE — 99213 OFFICE O/P EST LOW 20 MIN: CPT | Performed by: NURSE PRACTITIONER

## 2019-11-26 RX ORDER — OMEPRAZOLE 40 MG/1
40 CAPSULE, DELAYED RELEASE ORAL DAILY
Qty: 60 CAPSULE | Refills: 0 | Status: SHIPPED | OUTPATIENT
Start: 2019-11-26 | End: 2020-03-09

## 2019-11-26 RX ORDER — FAMOTIDINE 20 MG/1
20 TABLET ORAL NIGHTLY PRN
Qty: 30 TABLET | Refills: 0 | Status: SHIPPED | OUTPATIENT
Start: 2019-11-26 | End: 2019-12-26

## 2019-11-26 RX ORDER — RANITIDINE 150 MG/1
150 TABLET ORAL
COMMUNITY
End: 2019-11-26

## 2019-11-26 NOTE — PROGRESS NOTES
Leonardo Watson is a 44year old female who presents for GERD. The patient complaints of heartburn. Sees Dr. Ainsley Parker who manages symptoms, she is here because she needs a referral to see her again & requests Omeprazole refill.  GERD symptoms have been c Situational anxiety 3/23/2018      Past Surgical History:   Procedure Laterality Date   • BRAVO ESOPHAGOGASTRODUODENOSCOPY N/A 2018    Performed by Helen Bridges MD at [facility] ENDOSCOPY   •       x1   • COLONOSCOPY  2015    melanosis, two Pulse 90   Temp 97.8 °F (36.6 °C) (Oral)   Resp 16   Ht 60.5\"   Wt 132 lb 8 oz (60.1 kg)   LMP 07/01/2014   SpO2 99%   Breastfeeding No   BMI 25.45 kg/m²   Body mass index is 25.45 kg/m².    GENERAL: well developed, well nourished,in no apparent distress

## 2020-01-28 DIAGNOSIS — K21.9 GASTROESOPHAGEAL REFLUX DISEASE, ESOPHAGITIS PRESENCE NOT SPECIFIED: ICD-10-CM

## 2020-01-29 RX ORDER — OMEPRAZOLE 40 MG/1
CAPSULE, DELAYED RELEASE ORAL
Qty: 60 CAPSULE | Refills: 0 | OUTPATIENT
Start: 2020-01-29

## 2020-01-29 NOTE — TELEPHONE ENCOUNTER
Last OV: 11/26/19 with NAVYA Olguin  Last refill date: 11/26/19     #/refills: #60, 0 refills  When pt was asked to return for OV: \"The patient is asked to return if sx's persist or worsen\"  Upcoming appt/reason: no upcoming appt  Last labs 7/20/18

## 2020-02-20 ENCOUNTER — HOSPITAL ENCOUNTER (OUTPATIENT)
Age: 50
Discharge: HOME | End: 2020-02-20
Attending: FAMILY MEDICINE
Payer: COMMERCIAL

## 2020-02-20 VITALS
RESPIRATION RATE: 16 BRPM | HEIGHT: 61 IN | SYSTOLIC BLOOD PRESSURE: 131 MMHG | WEIGHT: 130 LBS | HEART RATE: 93 BPM | BODY MASS INDEX: 24.55 KG/M2 | DIASTOLIC BLOOD PRESSURE: 67 MMHG | OXYGEN SATURATION: 98 % | TEMPERATURE: 97 F

## 2020-02-20 DIAGNOSIS — J01.00 ACUTE NON-RECURRENT MAXILLARY SINUSITIS: Primary | ICD-10-CM

## 2020-02-20 DIAGNOSIS — J20.9 ACUTE BRONCHITIS, UNSPECIFIED ORGANISM: ICD-10-CM

## 2020-02-20 PROCEDURE — 99214 OFFICE O/P EST MOD 30 MIN: CPT

## 2020-02-20 PROCEDURE — 94664 DEMO&/EVAL PT USE INHALER: CPT

## 2020-02-20 PROCEDURE — 99213 OFFICE O/P EST LOW 20 MIN: CPT

## 2020-02-20 RX ORDER — ALBUTEROL SULFATE 90 UG/1
2 AEROSOL, METERED RESPIRATORY (INHALATION) EVERY 4 HOURS PRN
Qty: 1 INHALER | Refills: 0 | Status: SHIPPED | OUTPATIENT
Start: 2020-02-20 | End: 2020-03-09 | Stop reason: ALTCHOICE

## 2020-02-20 RX ORDER — FLUTICASONE PROPIONATE 50 MCG
SPRAY, SUSPENSION (ML) NASAL
Qty: 1 INHALER | Refills: 0 | Status: SHIPPED | OUTPATIENT
Start: 2020-02-20 | End: 2020-03-09 | Stop reason: ALTCHOICE

## 2020-02-20 RX ORDER — AMOXICILLIN AND CLAVULANATE POTASSIUM 875; 125 MG/1; MG/1
875 TABLET, FILM COATED ORAL 2 TIMES DAILY
Qty: 20 TABLET | Refills: 0 | Status: SHIPPED | OUTPATIENT
Start: 2020-02-20 | End: 2020-03-09 | Stop reason: ALTCHOICE

## 2020-02-20 NOTE — ED INITIAL ASSESSMENT (HPI)
Patient states productive cough with chest congestion for 10 days  Sinus pressure and congestion with post nasal drip  Ear discomfort  Headache

## 2020-02-20 NOTE — ED PROVIDER NOTES
Patient Seen in: [facility]      History   Patient presents with:  Cough    Stated Complaint: cough/congestion    HPI    This 76-year-old female with a history for GERD presents to the office with a 10-day history of worsening s 25 years ago--social smoker    Alcohol use: Yes      Comment: social    Drug use: No             Review of Systems    Positive for stated complaint: cough/congestion  Other systems are as noted in HPI. Constitutional and vital signs reviewed.       All oth Clinical Impression:  Acute non-recurrent maxillary sinusitis  (primary encounter diagnosis)  Acute bronchitis, unspecified organism    Disposition:  Discharge  2/20/2020  9:07 am    Follow-up:  Namita Magana MD  [address] [place] [address]

## 2020-03-03 ENCOUNTER — TELEPHONE (OUTPATIENT)
Dept: INTERNAL MEDICINE CLINIC | Facility: CLINIC | Age: 50
End: 2020-03-03

## 2020-03-03 NOTE — TELEPHONE ENCOUNTER
Due for cpx asap and mammogram order in breast cancer gap project. LvM to call for an apt.  She can see bill

## 2020-03-09 ENCOUNTER — OFFICE VISIT (OUTPATIENT)
Dept: INTERNAL MEDICINE CLINIC | Facility: CLINIC | Age: 50
End: 2020-03-09

## 2020-03-09 ENCOUNTER — APPOINTMENT (OUTPATIENT)
Dept: LAB | Age: 50
End: 2020-03-09
Attending: PHYSICIAN ASSISTANT

## 2020-03-09 VITALS
TEMPERATURE: 98 F | RESPIRATION RATE: 16 BRPM | SYSTOLIC BLOOD PRESSURE: 120 MMHG | HEIGHT: 60.75 IN | OXYGEN SATURATION: 98 % | DIASTOLIC BLOOD PRESSURE: 80 MMHG | HEART RATE: 93 BPM | WEIGHT: 127.75 LBS | BODY MASS INDEX: 24.43 KG/M2

## 2020-03-09 DIAGNOSIS — Z63.4 BEREAVEMENT: ICD-10-CM

## 2020-03-09 DIAGNOSIS — Z12.31 VISIT FOR SCREENING MAMMOGRAM: ICD-10-CM

## 2020-03-09 DIAGNOSIS — Z00.00 ANNUAL PHYSICAL EXAM: ICD-10-CM

## 2020-03-09 DIAGNOSIS — G47.00 INSOMNIA, UNSPECIFIED TYPE: ICD-10-CM

## 2020-03-09 DIAGNOSIS — Z00.00 ANNUAL PHYSICAL EXAM: Primary | ICD-10-CM

## 2020-03-09 LAB
ALT SERPL-CCNC: 20 U/L (ref 13–56)
ANION GAP SERPL CALC-SCNC: 5 MMOL/L (ref 0–18)
AST SERPL-CCNC: 17 U/L (ref 15–37)
BUN BLD-MCNC: 13 MG/DL (ref 7–18)
BUN/CREAT SERPL: 21 (ref 10–20)
CALCIUM BLD-MCNC: 9.3 MG/DL (ref 8.5–10.1)
CHLORIDE SERPL-SCNC: 107 MMOL/L (ref 98–112)
CHOLEST SMN-MCNC: 213 MG/DL (ref ?–200)
CO2 SERPL-SCNC: 26 MMOL/L (ref 21–32)
CREAT BLD-MCNC: 0.62 MG/DL (ref 0.55–1.02)
EST. AVERAGE GLUCOSE BLD GHB EST-MCNC: 108 MG/DL (ref 68–126)
GLUCOSE BLD-MCNC: 83 MG/DL (ref 70–99)
HBA1C MFR BLD HPLC: 5.4 % (ref ?–5.7)
HCV AB SERPL QL IA: NONREACTIVE
HDLC SERPL-MCNC: 73 MG/DL (ref 40–59)
LDLC SERPL CALC-MCNC: 121 MG/DL (ref ?–100)
NONHDLC SERPL-MCNC: 140 MG/DL (ref ?–130)
OSMOLALITY SERPL CALC.SUM OF ELEC: 285 MOSM/KG (ref 275–295)
PATIENT FASTING Y/N/NP: YES
PATIENT FASTING Y/N/NP: YES
POTASSIUM SERPL-SCNC: 4 MMOL/L (ref 3.5–5.1)
SODIUM SERPL-SCNC: 138 MMOL/L (ref 136–145)
TRIGL SERPL-MCNC: 97 MG/DL (ref 30–149)
TSI SER-ACNC: 1.84 MIU/ML (ref 0.36–3.74)
VLDLC SERPL CALC-MCNC: 19 MG/DL (ref 0–30)

## 2020-03-09 PROCEDURE — 80048 BASIC METABOLIC PNL TOTAL CA: CPT

## 2020-03-09 PROCEDURE — 36415 COLL VENOUS BLD VENIPUNCTURE: CPT

## 2020-03-09 PROCEDURE — 80061 LIPID PANEL: CPT

## 2020-03-09 PROCEDURE — G0438 PPPS, INITIAL VISIT: HCPCS | Performed by: PHYSICIAN ASSISTANT

## 2020-03-09 PROCEDURE — 90750 HZV VACC RECOMBINANT IM: CPT | Performed by: PHYSICIAN ASSISTANT

## 2020-03-09 PROCEDURE — 84460 ALANINE AMINO (ALT) (SGPT): CPT

## 2020-03-09 PROCEDURE — 84450 TRANSFERASE (AST) (SGOT): CPT

## 2020-03-09 PROCEDURE — 90471 IMMUNIZATION ADMIN: CPT | Performed by: PHYSICIAN ASSISTANT

## 2020-03-09 PROCEDURE — 83036 HEMOGLOBIN GLYCOSYLATED A1C: CPT

## 2020-03-09 PROCEDURE — 99214 OFFICE O/P EST MOD 30 MIN: CPT | Performed by: PHYSICIAN ASSISTANT

## 2020-03-09 PROCEDURE — 86803 HEPATITIS C AB TEST: CPT

## 2020-03-09 PROCEDURE — 84443 ASSAY THYROID STIM HORMONE: CPT

## 2020-03-09 PROCEDURE — 99396 PREV VISIT EST AGE 40-64: CPT | Performed by: PHYSICIAN ASSISTANT

## 2020-03-09 RX ORDER — BENZONATATE 200 MG/1
200 CAPSULE ORAL 3 TIMES DAILY PRN
Qty: 20 CAPSULE | Refills: 0 | Status: SHIPPED | OUTPATIENT
Start: 2020-03-09 | End: 2020-03-19

## 2020-03-09 RX ORDER — LORAZEPAM 0.5 MG/1
0.5 TABLET ORAL NIGHTLY PRN
Qty: 15 TABLET | Refills: 0 | Status: SHIPPED | OUTPATIENT
Start: 2020-03-09 | End: 2020-03-26 | Stop reason: ALTCHOICE

## 2020-03-09 SDOH — SOCIAL STABILITY - SOCIAL INSECURITY: DISSAPEARANCE AND DEATH OF FAMILY MEMBER: Z63.4

## 2020-03-09 NOTE — PROGRESS NOTES
Neymar Scott is a 44year old female who presents for a complete physical exam.   HPI:   Pt presents for annual wellness screening as well as other issues. In the IC almost three weeks ago for URI symptoms.   Completed 10 days of Augmentin and used F SURGICAL HISTORY  6/2016    L eye pterygium excision   • SLING OPER STRES INCONTINENCE  8/1/2005    mid urethral sling Dr. Kaleta Libman   • UPPER GI ENDOSCOPY PERFORMED  5/2015    gastritis, neg for HP      Family History   Problem Relation Age of Onset   • Ot lymphadenopathy, no thyromegaly  LUNGS: regular breathing rate and effort, clear to auscultation bilaterally  CARDIO: RRR, normal S1 & S2, no murmur  GI: soft, non-tender, non-distended, no hepatoplenomegaly  : deferred  BREAST: skin unremarkable, no lum

## 2020-03-09 NOTE — PATIENT INSTRUCTIONS
Blood work today.     Sleep / anxiety:  - start lorazepam 0.5 mg - may take 1 tablet 30-45 minutes prior to bed time  - please call [facility] if this does not help with sleep  - try to use this only as needed over the next few weeks    Cough:  - start benzona

## 2020-03-26 ENCOUNTER — TELEPHONE (OUTPATIENT)
Dept: INTERNAL MEDICINE CLINIC | Facility: CLINIC | Age: 50
End: 2020-03-26

## 2020-03-26 RX ORDER — LORAZEPAM 0.5 MG/1
0.5 TABLET ORAL NIGHTLY PRN
Qty: 15 TABLET | Refills: 0 | OUTPATIENT
Start: 2020-03-26

## 2020-03-26 RX ORDER — TRAZODONE HYDROCHLORIDE 50 MG/1
50 TABLET ORAL NIGHTLY PRN
Qty: 30 TABLET | Refills: 2 | Status: SHIPPED | OUTPATIENT
Start: 2020-03-26 | End: 2021-02-10

## 2020-03-26 NOTE — TELEPHONE ENCOUNTER
Patient had requested refill of lorazepam for sleep. Spoke with patient:  Has been taking lorazepam nightly for sleep for the past two weeks. Sleep issues arose following the unexpected death of her .   Is getting about 5-6 hours of sleep with th

## 2020-03-26 NOTE — TELEPHONE ENCOUNTER
LORAZEPAM 0.5 mg Oral Nightly PRN    Last OV relevant to medication: 3/9/2020  Last refill date: 3/9/2020    #/refills: #15 with 0 refill   When pt was asked to return for OV: 1 yr for cpx   Upcoming appt/reason: no FOV scheduled

## 2020-03-26 NOTE — TELEPHONE ENCOUNTER
Patient was recently prescribed medication: Lorazepam 0.5 MG. Was instructed to call back to see how she felt on the medication. Patient stated that she believes it is working well and requesting refill to continue taking      Barb Finne[address], [address] [facility] [phone], [phone]

## 2020-04-03 ENCOUNTER — TELEPHONE (OUTPATIENT)
Dept: INTERNAL MEDICINE CLINIC | Facility: CLINIC | Age: 50
End: 2020-04-03

## 2020-04-03 NOTE — TELEPHONE ENCOUNTER
Spoke with patient. Trazodone working well for sleep. Getting 6-7 hours a night. C/o having a couple headaches since starting medication. Temporal region. Improves with single dose of tylenol. No other symptoms.   Recommend increased fluids and monito

## 2020-04-03 NOTE — TELEPHONE ENCOUNTER
Spoke with patient stating she was just calling with an update per request by Dearl Mairchuy: states the sleeping pills have been working well, she is sleeping 6-7 hours.

## 2021-02-10 ENCOUNTER — OFFICE VISIT (OUTPATIENT)
Dept: INTERNAL MEDICINE CLINIC | Facility: CLINIC | Age: 51
End: 2021-02-10
Payer: MEDICAID

## 2021-02-10 VITALS
DIASTOLIC BLOOD PRESSURE: 80 MMHG | HEIGHT: 60.5 IN | SYSTOLIC BLOOD PRESSURE: 112 MMHG | BODY MASS INDEX: 26.97 KG/M2 | TEMPERATURE: 98 F | RESPIRATION RATE: 16 BRPM | HEART RATE: 80 BPM | WEIGHT: 141 LBS | OXYGEN SATURATION: 98 %

## 2021-02-10 DIAGNOSIS — R12 HEARTBURN: Primary | ICD-10-CM

## 2021-02-10 DIAGNOSIS — G56.03 BILATERAL CARPAL TUNNEL SYNDROME: ICD-10-CM

## 2021-02-10 DIAGNOSIS — Z00.00 LABORATORY EXAM ORDERED AS PART OF ROUTINE GENERAL MEDICAL EXAMINATION: ICD-10-CM

## 2021-02-10 DIAGNOSIS — R23.8 EASY BRUISING: ICD-10-CM

## 2021-02-10 PROCEDURE — 3074F SYST BP LT 130 MM HG: CPT | Performed by: PHYSICIAN ASSISTANT

## 2021-02-10 PROCEDURE — 3079F DIAST BP 80-89 MM HG: CPT | Performed by: PHYSICIAN ASSISTANT

## 2021-02-10 PROCEDURE — 3008F BODY MASS INDEX DOCD: CPT | Performed by: PHYSICIAN ASSISTANT

## 2021-02-10 PROCEDURE — 99214 OFFICE O/P EST MOD 30 MIN: CPT | Performed by: PHYSICIAN ASSISTANT

## 2021-02-10 RX ORDER — CIMETIDINE 300 MG/1
300 TABLET, FILM COATED ORAL 2 TIMES DAILY
Qty: 180 TABLET | Refills: 3 | Status: SHIPPED | OUTPATIENT
Start: 2021-02-10

## 2021-02-10 NOTE — PATIENT INSTRUCTIONS
Heartburn:  - continue cimetidine 300 mg - take 1 tablet each morning and may take 2nd dose later in the day if needed  - minimize caffeine, spicy foods, citrus fruits/juices, tomato based foods/drinks  - avoid eating large meals  - do not lay down for at

## 2021-02-10 NOTE — PROGRESS NOTES
Sofy Seymour is a 46year old female. HPI:   Patient presents for f/u of GERD. C/o intermittent burning sensation in center of chest after eating - maría after pizza, anything in vinegar, tabasco sauce. Does ok with coffee (one cup in the AM).   Rar normal upper endoscopy.   Successful placement of esophageal PH probe (96 hour study, 48 hour off PPI, 48 hour on), Ph consisent with GERD off of PPI, GERD resolves on PPI therapy   • ESOPHAGOGASTRODUODENOSCOPY, COLONOSCOPY, POSSIBLE BIOPSY, POSSIBLE POLYPE lymphadenopathy  LUNGS: regular breathing rate and effort, clear to auscultation bilaterally  CARDIO: RRR, normal S1 & S2, no murmur  GI: soft, non-tender, non-distended, no guarding or rebound tenderness, no hepatosplenomegaly  EXTREMITIES: no cyanosis, c

## 2021-03-03 ENCOUNTER — HOSPITAL ENCOUNTER (OUTPATIENT)
Dept: MAMMOGRAPHY | Age: 51
Discharge: HOME | End: 2021-03-03
Attending: PHYSICIAN ASSISTANT
Payer: MEDICAID

## 2021-03-03 ENCOUNTER — LAB ENCOUNTER (OUTPATIENT)
Dept: LAB | Age: 51
End: 2021-03-03
Attending: PHYSICIAN ASSISTANT
Payer: MEDICAID

## 2021-03-03 DIAGNOSIS — Z12.31 VISIT FOR SCREENING MAMMOGRAM: ICD-10-CM

## 2021-03-03 DIAGNOSIS — R23.8 EASY BRUISING: ICD-10-CM

## 2021-03-03 DIAGNOSIS — Z00.00 LABORATORY EXAM ORDERED AS PART OF ROUTINE GENERAL MEDICAL EXAMINATION: ICD-10-CM

## 2021-03-03 LAB
ANION GAP SERPL CALC-SCNC: 5 MMOL/L (ref 0–18)
BASOPHILS # BLD AUTO: 0.04 X10(3) UL (ref 0–0.2)
BASOPHILS NFR BLD AUTO: 0.9 %
BUN BLD-MCNC: 12 MG/DL (ref 7–18)
BUN/CREAT SERPL: 19 (ref 10–20)
CALCIUM BLD-MCNC: 9.6 MG/DL (ref 8.5–10.1)
CHLORIDE SERPL-SCNC: 109 MMOL/L (ref 98–112)
CHOLEST SMN-MCNC: 247 MG/DL (ref ?–200)
CO2 SERPL-SCNC: 28 MMOL/L (ref 21–32)
CREAT BLD-MCNC: 0.63 MG/DL
DEPRECATED RDW RBC AUTO: 45.5 FL (ref 35.1–46.3)
EOSINOPHIL # BLD AUTO: 0.14 X10(3) UL (ref 0–0.7)
EOSINOPHIL NFR BLD AUTO: 3 %
ERYTHROCYTE [DISTWIDTH] IN BLOOD BY AUTOMATED COUNT: 13 % (ref 11–15)
GLUCOSE BLD-MCNC: 93 MG/DL (ref 70–99)
HCT VFR BLD AUTO: 38.7 %
HDLC SERPL-MCNC: 76 MG/DL (ref 40–59)
HGB BLD-MCNC: 12.8 G/DL
IMM GRANULOCYTES # BLD AUTO: 0.01 X10(3) UL (ref 0–1)
IMM GRANULOCYTES NFR BLD: 0.2 %
LDLC SERPL CALC-MCNC: 140 MG/DL (ref ?–100)
LYMPHOCYTES # BLD AUTO: 2.32 X10(3) UL (ref 1–4)
LYMPHOCYTES NFR BLD AUTO: 50.1 %
MCH RBC QN AUTO: 31.5 PG (ref 26–34)
MCHC RBC AUTO-ENTMCNC: 33.1 G/DL (ref 31–37)
MCV RBC AUTO: 95.3 FL
MONOCYTES # BLD AUTO: 0.37 X10(3) UL (ref 0.1–1)
MONOCYTES NFR BLD AUTO: 8 %
NEUTROPHILS # BLD AUTO: 1.75 X10 (3) UL (ref 1.5–7.7)
NEUTROPHILS # BLD AUTO: 1.75 X10(3) UL (ref 1.5–7.7)
NEUTROPHILS NFR BLD AUTO: 37.8 %
NONHDLC SERPL-MCNC: 171 MG/DL (ref ?–130)
OSMOLALITY SERPL CALC.SUM OF ELEC: 293 MOSM/KG (ref 275–295)
PATIENT FASTING Y/N/NP: YES
PATIENT FASTING Y/N/NP: YES
PLATELET # BLD AUTO: 252 10(3)UL (ref 150–450)
POTASSIUM SERPL-SCNC: 4 MMOL/L (ref 3.5–5.1)
RBC # BLD AUTO: 4.06 X10(6)UL
SODIUM SERPL-SCNC: 142 MMOL/L (ref 136–145)
TRIGL SERPL-MCNC: 154 MG/DL (ref 30–149)
VLDLC SERPL CALC-MCNC: 31 MG/DL (ref 0–30)
WBC # BLD AUTO: 4.6 X10(3) UL (ref 4–11)

## 2021-03-03 PROCEDURE — 80061 LIPID PANEL: CPT

## 2021-03-03 PROCEDURE — 85025 COMPLETE CBC W/AUTO DIFF WBC: CPT

## 2021-03-03 PROCEDURE — 77063 BREAST TOMOSYNTHESIS BI: CPT | Performed by: PHYSICIAN ASSISTANT

## 2021-03-03 PROCEDURE — 36415 COLL VENOUS BLD VENIPUNCTURE: CPT

## 2021-03-03 PROCEDURE — 80048 BASIC METABOLIC PNL TOTAL CA: CPT

## 2021-03-03 PROCEDURE — 77067 SCR MAMMO BI INCL CAD: CPT | Performed by: PHYSICIAN ASSISTANT

## 2022-07-12 ENCOUNTER — OFFICE VISIT (OUTPATIENT)
Dept: UROLOGY | Facility: HOSPITAL | Age: 52
End: 2022-07-12
Attending: OBSTETRICS & GYNECOLOGY
Payer: MEDICAID

## 2022-07-12 VITALS
WEIGHT: 140 LBS | DIASTOLIC BLOOD PRESSURE: 62 MMHG | BODY MASS INDEX: 26.43 KG/M2 | HEIGHT: 61 IN | SYSTOLIC BLOOD PRESSURE: 100 MMHG

## 2022-07-12 DIAGNOSIS — N95.2 VAGINAL ATROPHY: ICD-10-CM

## 2022-07-12 DIAGNOSIS — N39.3 STRESS INCONTINENCE: Primary | ICD-10-CM

## 2022-07-12 DIAGNOSIS — N94.10 DYSPAREUNIA, FEMALE: ICD-10-CM

## 2022-07-12 PROCEDURE — 99212 OFFICE O/P EST SF 10 MIN: CPT

## 2022-07-12 RX ORDER — ESTRADIOL 0.1 MG/G
CREAM VAGINAL
Qty: 42.5 G | Refills: 3 | Status: SHIPPED | OUTPATIENT
Start: 2022-07-12

## 2022-07-18 ENCOUNTER — TELEPHONE (OUTPATIENT)
Dept: PHYSICAL THERAPY | Facility: HOSPITAL | Age: 52
End: 2022-07-18

## 2022-08-24 ENCOUNTER — OFFICE VISIT (OUTPATIENT)
Dept: PHYSICAL THERAPY | Facility: HOSPITAL | Age: 52
End: 2022-08-24
Attending: OBSTETRICS & GYNECOLOGY
Payer: MEDICAID

## 2022-08-24 DIAGNOSIS — N39.3 STRESS INCONTINENCE: ICD-10-CM

## 2022-08-24 PROCEDURE — 97110 THERAPEUTIC EXERCISES: CPT

## 2022-08-24 PROCEDURE — 97162 PT EVAL MOD COMPLEX 30 MIN: CPT

## 2022-08-24 PROCEDURE — 97112 NEUROMUSCULAR REEDUCATION: CPT

## 2022-08-30 ENCOUNTER — APPOINTMENT (OUTPATIENT)
Dept: PHYSICAL THERAPY | Facility: HOSPITAL | Age: 52
End: 2022-08-30
Attending: OBSTETRICS & GYNECOLOGY
Payer: MEDICAID

## 2022-09-06 ENCOUNTER — OFFICE VISIT (OUTPATIENT)
Dept: PHYSICAL THERAPY | Facility: HOSPITAL | Age: 52
End: 2022-09-06
Attending: OBSTETRICS & GYNECOLOGY
Payer: MEDICAID

## 2022-09-06 DIAGNOSIS — N39.3 STRESS INCONTINENCE: ICD-10-CM

## 2022-09-06 PROCEDURE — 97112 NEUROMUSCULAR REEDUCATION: CPT

## 2022-09-06 PROCEDURE — 97110 THERAPEUTIC EXERCISES: CPT

## 2022-09-06 NOTE — PROGRESS NOTES
MUSCULOSKELETAL AND PELVIC FLOOR EVALUATION:       Diagnosis:   Stress incontinence (N39.3)      Referring Provider: Yonatan Mancuso  Date of Evaluation:    8/24/2022    Precautions:  None Next MD visit:   none scheduled  Date of Surgery: n/a   Insurance Primary/Secondary: [employer] MEDICAID / N/A     # Auth Visits: 4 8/24-10/23            Subjective: doing kegel's. Had some leakage with bending. Pain: 0/10      Objective: Tx flow sheet       Assessment: improved pelvic floor muscles via fascially connected mm. Upgraded HEP. Goals:   Goals: (to be met in 10 visits)  1. Independent in HEP and progression with improved understanding of bladder/bowel health, bladder retraining and long-term management. 2. Patient will demonstrate improved bladder voiding habits to voiding every 2 hours without urinary leakage. 3. Patient will demonstrate improve PFM isolation, coordination and strength to 3/8/8/8 so she is able to reduce urinary urge and prevent leakage during ADL's.  4. Nocturnal voiding 1x/night for improved sleep. 5. PFM contraction before increase intra-abdominal pressure. 6. Pt will have increased transverse abdominis muscle strength to 3/5 and hip strength to 5/5 to assist with supporting pelvic floor muscles. Plan: Continue plan of care as pt tolerates with focus on pelvic floor muscle strengthening and coordination. Next visit: biofeedback   Date: 9/6/2022  TX#: 2/10  Ins auth 4 Date:                 TX#: 3/ Date:                 TX#: 4/ Date:                 TX#: 5/ Date:    Tx#: 6/   PFM NM  Re-ed     Bladder diary  Bladder fitness  Graded exercise with exercise     biofeedback       abdominal bracing   pelvic brace     Kegel +  iso hip add  10\" x10     Kegel +  Resisted   ER RTB  x10     Kegel +  U/LE lift  x10     Kegel + articulating bridge x10 w iso hip add   Kegel + clamshell RTB x10 R/L      Kegel with plie/ER resisted RTB x10   HEP and RTB issued for above    Sit on ball-  pelvic brace x10  + diaphragmatic breathing     Standing-  Kegel + iso hip add + squat w ball behind back x20              HEP: Kegel 10 repetitions 3x/day, 10 sec on/10 sec off, 2 sec on 2-4 sec off ,  diaphragmatic breathing x10 , + additions above     Charges: TEx2, NM Re-ed       Total Timed Treatment: 45 min  Total Treatment Time: 45 min    Initial evaluation:   Jolie Rosenberg is a 45year old female  who presents to therapy today with complaints of leakage with coughing and running for the past 3 months. Also has leakage with urgency-running water and when she gets to the bathroom. . Some time feels like pelvic floor muscles falling. Had bladder sling 14 years ago. Unable to walk >1 mile due to leakage and urgency. Current symptoms include: vaginal pressure, urgency/frequency, constipation and leakage    Occupation/Activities: limited with walking, running  PFDI-20: 160.4/300;  Impairment= 54 %      URINARY HABITS  Types of symptoms: stress incontinence and urge incontinence  Abdominal/Vaginal Pressure complaints: yes  Urinary Frequency: 2 hours  Leaking occurs: coughing, sneezing, false urges  Episodes of Leakage: 0-5 times per week  Pad use: liner  Nocturia: 2x  Hovering: yes  Empty bladder just in case: yes      BOWEL HABITS  Types of symptoms: Constipation   Frequency of bowel movements: 1-2x/day  Stool consistency: Counselor Stool Scale: 1,3, 4  Do you strain with defecation: Yes     SEXUAL HEALTH STATUS  Sexual [place] Status: not active      Strength (MMT) 5/5 ROBERT LE except hip abd and ext 4/5  Transverse Abdominis: 2/5    External Observation:   Voluntary contraction: present  Voluntary relaxation: present  Involuntary contraction: absent  Involuntary relaxation: present      Internal Examination     Pelvic Floor Muscle strength: (PERF= Power/Endurance/Reps/Fast) MMT: 2/5/2/4  Accessory Muscle Use: abdominals    Tissue Laxity Test:  Anterior Wall: Min  Posterior Wall: WNL  Apical: WNL    Bearing down Valsalva maneuver (2-3x): + cystocele    Internal Palpation: WNL

## 2022-09-13 ENCOUNTER — OFFICE VISIT (OUTPATIENT)
Dept: PHYSICAL THERAPY | Facility: HOSPITAL | Age: 52
End: 2022-09-13
Attending: OBSTETRICS & GYNECOLOGY
Payer: MEDICAID

## 2022-09-13 DIAGNOSIS — N39.3 STRESS INCONTINENCE: ICD-10-CM

## 2022-09-13 PROCEDURE — 97112 NEUROMUSCULAR REEDUCATION: CPT

## 2022-09-13 PROCEDURE — 97110 THERAPEUTIC EXERCISES: CPT

## 2022-09-13 NOTE — PROGRESS NOTES
MUSCULOSKELETAL AND PELVIC FLOOR EVALUATION:       Diagnosis:   Stress incontinence (N39.3)      Referring Provider: Fred Bustos  Date of Evaluation:    8/24/2022    Precautions:  None Next MD visit:   none scheduled  Date of Surgery: n/a   Insurance Primary/Secondary: BLUE CROSS MEDICAID / N/A     # Auth Visits: 4 8/24-10/23            Subjective: had uncontrolled leakage when she was crying . Trying to use strategies to decrease nocturia. Pain: 0/10      Objective: Tx flow sheet   9/13/2022   Biofeedback: supine-Recruitment good, holding ability poor, derecruitment fair with tonic good with phaic, baseline between contractions 3-5.0, baseline resting average 3.0Ua (normal 2.0Ua)  , sitting-resting tone flatline    Assessment:   improved pelvic floor muscle coordination via biofeedback in various planes of motion      Goals:   Goals: (to be met in 10 visits)  1. Independent in HEP and progression with improved understanding of bladder/bowel health, bladder retraining and long-term management. 2. Patient will demonstrate improved bladder voiding habits to voiding every 2 hours without urinary leakage. 3. Patient will demonstrate improve PFM isolation, coordination and strength to 3/8/8/8 so she is able to reduce urinary urge and prevent leakage during ADL's.  4. Nocturnal voiding 1x/night for improved sleep. 5. PFM contraction before increase intra-abdominal pressure. 6. Pt will have increased transverse abdominis muscle strength to 3/5 and hip strength to 5/5 to assist with supporting pelvic floor muscles. Plan: Continue plan of care as pt tolerates with focus on pelvic floor muscle strengthening and coordination. Next visit: pelvic floor muscle rehab w  activities of daily living   Date: 9/6/2022  TX#: 2/10  Ins auth 4 Date:   9/13/2022   TX#: 3/ Date:                 TX#: 4/ Date:                 TX#: 5/ Date:    Tx#: 6/   PFM NM  Re-ed     Bladder diary  Bladder fitness  Graded exercise with exercise     PFM NM  Re-ed     stress urinary incontinence strategies    Pressure management/concept    Nocturia strategies    biofeedback -  external sensor was placed and leads were attached  Resting tone  Tonic  Phasic  [place]  With coordinated breathing    kegel +  *Sit<>stand  *March  *Stand  *sit on ball     Biofeedback with constant interpretation of results. abdominal bracing   pelvic brace     Kegel +  iso hip add  10\" x10     Kegel +  Resisted   ER RTB  x10     Kegel +  U/LE lift  x10     Kegel + articulating bridge x10 w iso hip add   Kegel + clamshell RTB x10 R/L      Kegel with plie/ER resisted RTB x10   HEP and RTB issued for above    Sit on ball-  pelvic brace x10  + diaphragmatic breathing     Standing-  Kegel + iso hip add + squat w ball behind back x20        abdominal bracing     kegel's    pelvic brace     kegel w activities of daily living       HEP: Kegel 10 repetitions 3x/day, 10 sec on/10 sec off, 2 sec on 2-4 sec off ,  diaphragmatic breathing x10 , + additions above     Charges: [place], [place] Re-edx2       Total Timed Treatment: 40 min  Total Treatment Time: 40 min    Initial evaluation:   Glenda Chase is a 45year old female  who presents to therapy today with complaints of leakage with coughing and running for the past 3 months. Also has leakage with urgency-running water and when she gets to the bathroom. . Some time feels like pelvic floor muscles falling. Had bladder sling 14 years ago. Unable to walk >1 mile due to leakage and urgency. Current symptoms include: vaginal pressure, urgency/frequency, constipation and leakage    Occupation/Activities: limited with walking, running  PFDI-20: 160.4/300;  Impairment= 54 %      URINARY HABITS  Types of symptoms: stress incontinence and urge incontinence  Abdominal/Vaginal Pressure complaints: yes  Urinary Frequency: 2 hours  Leaking occurs: coughing, sneezing, false urges  Episodes of Leakage: 0-5 times per week  Pad use: liner  Nocturia: 2x  Hovering: yes  Empty bladder just in case: yes      BOWEL HABITS  Types of symptoms: Constipation   Frequency of bowel movements: 1-2x/day  Stool consistency: [place] Stool Scale: 1,3, 4  Do you strain with defecation: Yes     SEXUAL HEALTH STATUS  Sexual Bricelyn Status: not active      Strength (MMT) 5/5 ROBERT LE except hip abd and ext 4/5  Transverse Abdominis: 2/5    External Observation:   Voluntary contraction: present  Voluntary relaxation: present  Involuntary contraction: absent  Involuntary relaxation: present      Internal Examination     Pelvic Floor Muscle strength: (PERF= Power/Endurance/Reps/Fast) MMT: 2/5/2/4  Accessory Muscle Use: abdominals    Tissue Laxity Test:  Anterior Wall: Min  Posterior Wall: WNL  Apical: WNL    Bearing down Valsalva maneuver (2-3x): + cystocele    Internal Palpation: WNL

## 2022-09-20 ENCOUNTER — OFFICE VISIT (OUTPATIENT)
Dept: PHYSICAL THERAPY | Facility: HOSPITAL | Age: 52
End: 2022-09-20
Attending: OBSTETRICS & GYNECOLOGY

## 2022-09-20 DIAGNOSIS — N39.3 STRESS INCONTINENCE: ICD-10-CM

## 2022-09-20 PROCEDURE — 97112 NEUROMUSCULAR REEDUCATION: CPT

## 2022-09-20 PROCEDURE — 97110 THERAPEUTIC EXERCISES: CPT

## 2022-09-20 NOTE — PROGRESS NOTES
Progress Summary  Pt has attended 4 visits in Physical Therapy. MUSCULOSKELETAL AND PELVIC FLOOR EVALUATION:       Diagnosis:   Stress incontinence (N39.3)      Referring Provider: Neeru Urban  Date of Evaluation:    8/24/2022    Precautions:  Carpal tunnel symptoms Next MD visit:   none scheduled  Date of Surgery: n/a   Insurance Primary/Secondary: BLUE CROSS MEDICAID / N/A     # Auth Visits: 4 8/24-10/23            Subjective: Had leakage 2x this past week when she was lifting. Overall, leakage decreasing. Doing HEP. Has severe CTS B hands. Planning on getting another shot in each wrist with possibility of surgery in the future. Nocturia 2x. Functional limitations: embarrassment with leakage during activities of daily living with walking and running    PFDI-20: 117/300 (was:160.4/300); Impairment= 39% (was:54 %)    Pain: 0/10      Objective:  Tx flow sheet     URINARY HABITS  Types of symptoms: stress incontinence and urge incontinence  Abdominal/Vaginal Pressure complaints: yes  Urinary Frequency: 2 hours  Leaking occurs: coughing, sneezing, false urges  Episodes of Leakage: 2x/week (was:5 times per week)  Pad use: liner  Nocturia: 2x  Hovering: yes-TRYING TO NOT DO THIS  Empty bladder just in case: yes-TRYING TO NOT DO THIS      BOWEL HABITS  Types of symptoms: Constipation   Frequency of bowel movements: 1-2x/day  Stool consistency: [place] Stool Scale: 1,3, 4  Do you strain with defecation: Yes -TRYING TO DRINK MORE WATER AND ADD MORE FIBER TO DECREASE THIS    Strength (MMT) 5/5 ROBERT LE except hip abd and ext 4/5  Transverse Abdominis: 2/5    External Observation:   Voluntary contraction: present  Voluntary relaxation: present  Involuntary contraction: absent  Involuntary relaxation: present      Internal Examination     Pelvic Floor Muscle strength: (PERF= Power/Endurance/Reps/Fast) MMT: 2/5/2/4-NOT RE-TESTED TODAY  Accessory Muscle Use: abdominals    Tissue Laxity Test:  Anterior Wall: Min  Posterior Wall: WNL  Apical: WNL    Bearing down Valsalva maneuver (2-3x): + cystocele    Internal Palpation: WNL         9/13/2022   Biofeedback: supine-Recruitment good, holding ability poor, derecruitment fair with tonic good with phasic, baseline between contractions 3-5.0, baseline resting average 3.0Ua (normal 2.0Ua)  , sitting-resting tone flatline    Assessment:   Pt has made improvement in physical therapy with improved pelvic floor muscle coordination and less leakage with activities of daily living . Pt has not returned to running. Goals progressing. .  Pt motivated to continue HEP. improved pelvic floor muscle coordination via fascially connected muscles in various planes of motion and simulated activities of daily living but pt still having leakage with higher level activities of daily living . Goals progressing. Pt would benefit from continued skilled physical therapy to address problems and to meet goals as outlined below. Goals:   Goals: (to be met in 10 visits)  1. Independent in HEP and progression with improved understanding of bladder/bowel health, bladder retraining and long-term management. -PROGRESSING  2. Patient will demonstrate improved bladder voiding habits to voiding every 2 hours without urinary leakage. -PROGRESSING  3. Patient will demonstrate improve PFM isolation, coordination and strength to 3/8/8/8 so she is able to reduce urinary urge and prevent leakage during ADL's.-NOT RE-TESTED INTERNALLY BUT PT HAVING LESS LEAKAGE AND URGENCY  4. Nocturnal voiding 1x/night for improved sleep. -PROGRESSING  5. PFM contraction before increase intra-abdominal pressure. -PROGRESSING  6. Pt will have increased transverse abdominis muscle strength to 3/5 and hip strength to 5/5 to assist with supporting pelvic floor muscles. -PROGRESSING      Plan: Continue skilled Physical Therapy 1 x/week or a total of 6 visits over a 90 day period. Treatment will include:  Therapeutic exercises to include: ROM, stretching, pelvic floor muscle and L/S strengthening, coordination, HEP,pelvic and core stabilization. Patient/Family/Caregiver was advised of these findings, precautions, and treatment options and has agreed to actively participate in planning and for this course of care. Thank you for your referral. If you have any questions, please contact me at Dept: [phone]. Sincerely,  Electronically signed by therapist: Thomas Shahid PT     Physician's certification required:  Yes  Please co-sign or sign and return this letter via fax as soon as possible to [phone]. I certify the need for these services furnished under this plan of treatment and while under my care. X___________________________________________________ Date____________________    Certification From: 1/14/2367  To:12/19/2022    Date: 9/6/2022  TX#: 2/10  Ins auth 4 Date:   9/13/2022   TX#: 3/ Date:      9/20/2022            TX#: 4/ Date:                 TX#: 5/ Date: Tx#: 6/   PFM NM  Re-ed     Bladder diary  Bladder fitness  Graded exercise with exercise     PFM NM  Re-ed     stress urinary incontinence strategies    Pressure management/concept    Nocturia strategies    biofeedback -  external sensor was placed and leads were attached  Resting tone  Tonic  Phasic  [place]  With coordinated breathing    kegel +  *Sit<>stand  *March  *Stand  *sit on ball     Biofeedback with constant interpretation of results.   PFM NM  Re-ed     stress urinary incontinence strategies    Pressure management/concept    Posture ed  Lumbar roll    Graded exercise concept     Ther Ex:     abdominal bracing   pelvic brace     Kegel +  iso hip add  10\" x10     Kegel +  Resisted   ER RTB  x10     Kegel +  U/LE lift  x10     Kegel + articulating bridge x10 w iso hip add   Kegel + clamshell RTB x10 R/L      Kegel with plie/ER resisted RTB x10   HEP and RTB issued for above    Sit on ball-  pelvic brace x10  + diaphragmatic breathing     Standing-  Kegel + iso hip add + squat w ball behind back x20        Ther Ex:   abdominal bracing     kegel's    pelvic brace     kegel w activities of daily living  Ther Ex:   Progress note    NuStep 5min L5     Sit on ball-  Kegel +  U/LE lift  x10    Kegel +  iso hip add  10\" x10     Kegel + articulating bridge x10      4 pt-  kegel + U/LE lift x10    4 pt-  Cat/camel w pelvic brace x10    Standing-  Kegel + iso hip add + squat w ball behind back x20     kegel +   Transfer sit<>stand x10    Pt education: :  Incorporate pelvic brace with activities of daily living      HEP: Kegel 10 repetitions 3x/day, 10 sec on/10 sec off, 2 sec on 2-4 sec off ,  diaphragmatic breathing x10 , + additions above     Charges: TEx2, NM Re-edx1       Total Timed Treatment: 40 min  Total Treatment Time: 40 min

## 2022-09-26 ENCOUNTER — TELEPHONE (OUTPATIENT)
Dept: PHYSICAL THERAPY | Facility: HOSPITAL | Age: 52
End: 2022-09-26

## 2022-09-27 ENCOUNTER — OFFICE VISIT (OUTPATIENT)
Dept: PHYSICAL THERAPY | Facility: HOSPITAL | Age: 52
End: 2022-09-27
Attending: OBSTETRICS & GYNECOLOGY

## 2022-09-27 DIAGNOSIS — N39.3 STRESS INCONTINENCE: ICD-10-CM

## 2022-09-27 PROCEDURE — 97110 THERAPEUTIC EXERCISES: CPT

## 2022-09-27 PROCEDURE — 97112 NEUROMUSCULAR REEDUCATION: CPT

## 2022-09-27 NOTE — PROGRESS NOTES
Diagnosis:   Stress incontinence (N39.3)      Referring Provider: Julianna Pineda  Date of Evaluation:    8/24/2022    Precautions:  Carpal tunnel symptoms Next MD visit:   none scheduled  Date of Surgery: n/a   Insurance Primary/Secondary: BLUE CROSS MEDICAID / N/A     # Auth Visits: luis, Armaan 8/24-10/23            Subjective:   Had leakage 2x this past week when she was lifting. Walking 1 1/2 miles but still concerned about leakage. Pt was walking 4 miles in the past.    PFDI-20: 117/300 (was:160.4/300); Impairment= 39% (was:54 %)-tested 9/20/22    Pain: 0/10      Objective:  Tx flow sheet     9/20/2022  URINARY HABITS  Types of symptoms: stress incontinence and urge incontinence  Abdominal/Vaginal Pressure complaints: yes  Urinary Frequency: 2 hours  Leaking occurs: coughing, sneezing, false urges  Episodes of Leakage: 2x/week (was:5 times per week)  Pad use: liner  Nocturia: 2x  Hovering: yes-TRYING TO NOT DO THIS  Empty bladder just in case: yes-TRYING TO NOT DO THIS      BOWEL HABITS  Types of symptoms: Constipation   Frequency of bowel movements: 1-2x/day  Stool consistency: [place] Stool Scale: 1,3, 4  Do you strain with defecation: Yes -TRYING TO DRINK MORE WATER AND ADD MORE FIBER TO DECREASE THIS    Strength (MMT) 5/5 ROBERT LE except hip abd and ext 4/5  Transverse Abdominis: 2/5    External Observation:   Voluntary contraction: present  Voluntary relaxation: present  Involuntary contraction: absent  Involuntary relaxation: present      Internal Examination     Pelvic Floor Muscle strength: (PERF= Power/Endurance/Reps/Fast) MMT: 2/5/2/4-NOT RE-TESTED TODAY  Accessory Muscle Use: abdominals    Tissue Laxity Test:  Anterior Wall: Min  Posterior Wall: WNL  Apical: WNL    Bearing down Valsalva maneuver (2-3x): + cystocele    Internal Palpation: WNL         9/13/2022   Biofeedback: supine-Recruitment good, holding ability poor, derecruitment fair with tonic good with phasic, baseline between contractions 3-5.0, baseline resting average 3.0Ua (normal 2.0Ua)  , sitting-resting tone flatline    Assessment:   Pt able to perform higher level coordinated simulated activities of daily living with kegel and coordinated breathing . No c/o leakage with lifting throughout session. Goals:   Goals: (to be met in 10 visits)  1. Independent in HEP and progression with improved understanding of bladder/bowel health, bladder retraining and long-term management. -PROGRESSING  2. Patient will demonstrate improved bladder voiding habits to voiding every 2 hours without urinary leakage. -PROGRESSING  3. Patient will demonstrate improve PFM isolation, coordination and strength to 3/8/8/8 so she is able to reduce urinary urge and prevent leakage during ADL's.-NOT RE-TESTED INTERNALLY BUT PT HAVING LESS LEAKAGE AND URGENCY  4. Nocturnal voiding 1x/night for improved sleep. -PROGRESSING  5. PFM contraction before increase intra-abdominal pressure. -PROGRESSING  6. Pt will have increased transverse abdominis muscle strength to 3/5 and hip strength to 5/5 to assist with supporting pelvic floor muscles. -PROGRESSING        Date: 9/6/2022  TX#: 2/10  Ins auth 4 Date:   9/13/2022   TX#: 3/ Date:      9/20/2022            TX#: 4/  Progress note Date:     9/27/2022             TX#: 5/ Date: Tx#: 6/   PFM NM  Re-ed     Bladder diary  Bladder fitness  Graded exercise with exercise     PFM NM  Re-ed     stress urinary incontinence strategies    Pressure management/concept    Nocturia strategies    biofeedback -  external sensor was placed and leads were attached  Resting tone  Tonic  Phasic  [place]  With coordinated breathing    kegel +  *Sit<>stand  *March  *Stand  *sit on ball     Biofeedback with constant interpretation of results.   PFM NM  Re-ed     stress urinary incontinence strategies    Pressure management/concept    Posture ed  Lumbar roll    Graded exercise concept PFM NM  Re-ed     OMERO strategies w activities of daily living     Posture ed    with coordinated breathing     Ther Ex:     abdominal bracing   pelvic brace     Kegel +  iso hip add  10\" x10     Kegel +  Resisted   ER RTB  x10     Kegel +  U/LE lift  x10     Kegel + articulating bridge x10 w iso hip add   Kegel + clamshell RTB x10 R/L      Kegel with plie/ER resisted RTB x10   HEP and RTB issued for above    Sit on ball-  pelvic brace x10  + diaphragmatic breathing     Standing-  Kegel + iso hip add + squat w ball behind back x20        Ther Ex:   abdominal bracing     kegel's    pelvic brace     kegel w activities of daily living  Ther Ex:   Progress note    NuStep 5min L5     Sit on ball-  Kegel +  U/LE lift  x10    Kegel +  iso hip add  10\" x10     Kegel + articulating bridge x10      4 pt-  kegel + U/LE lift x10    4 pt-  Cat/camel w pelvic brace x10    Standing-  Kegel + iso hip add + squat w ball behind back x20     kegel +   Transfer sit<>stand x10    Pt education: :  Incorporate pelvic brace with activities of daily living  Ther Ex:     Elliptical L5 5 min    Sit on ball-  Kegel +  U/LE lift  x20        Standing-  Kegel + iso hip add + squat w ball behind back x20     kegel +   Transfer sit<>stand 3x10  w iso hip add    pelvic brace +  Lifting waist<>overhead 5# x15    Lifting 10# waist<>floor x15    Shuttle- DLP  37# with pelvic brace + iso hip adduction x30 reps     Sit on ball-  scap retraction + row 15# x15    HEP: Kegel 10 repetitions 3x/day, 10 sec on/10 sec off, 2 sec on 2-4 sec off ,  diaphragmatic breathing x10 , + additions above     Charges: TEx2, NM Re-edx1       Total Timed Treatment: 40 min  Total Treatment Time: 40 min

## 2022-10-04 ENCOUNTER — OFFICE VISIT (OUTPATIENT)
Dept: PHYSICAL THERAPY | Facility: HOSPITAL | Age: 52
End: 2022-10-04
Attending: OBSTETRICS & GYNECOLOGY
Payer: MEDICAID

## 2022-10-04 DIAGNOSIS — N39.3 STRESS INCONTINENCE: ICD-10-CM

## 2022-10-04 PROCEDURE — 97110 THERAPEUTIC EXERCISES: CPT

## 2022-10-04 PROCEDURE — 97112 NEUROMUSCULAR REEDUCATION: CPT

## 2022-10-04 NOTE — PROGRESS NOTES
Diagnosis:   Stress incontinence (N39.3)      Referring Provider: Shellie Hodges  Date of Evaluation:    8/24/2022    Precautions:  Carpal tunnel symptoms Next MD visit:   none scheduled  Date of Surgery: n/a   Insurance Primary/Secondary: BLUE CROSS MEDICAID / N/A     # Auth Visits: eval, 4 8/24-10/23 + 6 9/27-11/26           Subjective:   Had leakage 1x this past week when she was doing quick sprint. Walking 2 miles-less concern about leakage. Pt was walking 4 miles in the past.    PFDI-20: 117/300 (was:160.4/300); Impairment= 39% (was:54 %)-tested 9/20/22    Pain: 0/10      Objective:  Tx flow sheet     9/20/2022  URINARY HABITS  Types of symptoms: stress incontinence and urge incontinence  Abdominal/Vaginal Pressure complaints: yes  Urinary Frequency: 2 hours  Leaking occurs: coughing, sneezing, false urges  Episodes of Leakage: 2x/week (was:5 times per week)  Pad use: liner  Nocturia: 2x  Hovering: yes-TRYING TO NOT DO THIS  Empty bladder just in case: yes-TRYING TO NOT DO THIS      BOWEL HABITS  Types of symptoms: Constipation   Frequency of bowel movements: 1-2x/day  Stool consistency: [place] Stool Scale: 1,3, 4  Do you strain with defecation: Yes -TRYING TO DRINK MORE WATER AND ADD MORE FIBER TO DECREASE THIS    Strength (MMT) 5/5 ROBERT LE except hip abd and ext 4/5  Transverse Abdominis: 2/5    External Observation:   Voluntary contraction: present  Voluntary relaxation: present  Involuntary contraction: absent  Involuntary relaxation: present      Internal Examination     Pelvic Floor Muscle strength: (PERF= Power/Endurance/Reps/Fast) MMT: 2/5/2/4-NOT RE-TESTED TODAY  Accessory Muscle Use: abdominals    Tissue Laxity Test:  Anterior Wall: Min  Posterior Wall: WNL  Apical: WNL    Bearing down Valsalva maneuver (2-3x): + cystocele    Internal Palpation: WNL         9/13/2022   Biofeedback: supine-Recruitment good, holding ability poor, derecruitment fair with tonic good with phasic, baseline between contractions 3-5.0, baseline resting average 3.0Ua (normal 2.0Ua)  , sitting-resting tone flatline    Assessment:   Pt able to perform higher level coordinated simulated activities of daily living with kegel and coordinated breathing . No c/o leakage with simulated activities of daily living throughout session. Frequent correction of breathing with kegel's throughout session. Progressed HEP to include simulated jumping in various planes of motion. Goals:   Goals: (to be met in 10 visits)  1. Independent in HEP and progression with improved understanding of bladder/bowel health, bladder retraining and long-term management. -PROGRESSING  2. Patient will demonstrate improved bladder voiding habits to voiding every 2 hours without urinary leakage. -PROGRESSING  3. Patient will demonstrate improve PFM isolation, coordination and strength to 3/8/8/8 so she is able to reduce urinary urge and prevent leakage during ADL's.-NOT RE-TESTED INTERNALLY BUT PT HAVING LESS LEAKAGE AND URGENCY  4. Nocturnal voiding 1x/night for improved sleep. -PROGRESSING  5. PFM contraction before increase intra-abdominal pressure. -PROGRESSING  6. Pt will have increased transverse abdominis muscle strength to 3/5 and hip strength to 5/5 to assist with supporting pelvic floor muscles. -PROGRESSING        Date: 9/6/2022  TX#: 2/10  Ins auth 4 Date:   9/13/2022   TX#: 3/ Date:      9/20/2022            TX#: 4/  Progress note Date:     9/27/2022             TX#: 5/  Ins auth 6   Date: 10/4/2022   Tx#: 6/ Date: Tx#: 7/ Date:    Tx#:8/     PFM NM  Re-ed     Bladder diary  Bladder fitness  Graded exercise with exercise     PFM NM  Re-ed     stress urinary incontinence strategies    Pressure management/concept    Nocturia strategies    biofeedback -  external sensor was placed and leads were attached  Resting tone  Tonic  Phasic  [place]  With coordinated breathing    kegel +  *Sit<>stand  *March  *Stand  *sit on ball     Biofeedback with constant interpretation of results.   PFM NM  Re-ed     stress urinary incontinence strategies    Pressure management/concept    Posture ed  Lumbar roll    Graded exercise concept PFM NM  Re-ed     OMERO strategies w activities of daily living     Posture ed    with coordinated breathing  PFM NM  Re-ed     OMERO strategies w activities of daily living     Posture ed    with coordinated breathing        Ther Ex:     abdominal bracing   pelvic brace     Kegel +  iso hip add  10\" x10     Kegel +  Resisted   ER RTB  x10     Kegel +  U/LE lift  x10     Kegel + articulating bridge x10 w iso hip add   Kegel + clamshell RTB x10 R/L      Kegel with plie/ER resisted RTB x10   HEP and RTB issued for above    Sit on ball-  pelvic brace x10  + diaphragmatic breathing     Standing-  Kegel + iso hip add + squat w ball behind back x20        Ther Ex:   abdominal bracing     kegel's    pelvic brace     kegel w activities of daily living  Ther Ex:   Progress note    NuStep 5min L5     Sit on ball-  Kegel +  U/LE lift  x10    Kegel +  iso hip add  10\" x10     Kegel + articulating bridge x10      4 pt-  kegel + U/LE lift x10    4 pt-  Cat/camel w pelvic brace x10    Standing-  Kegel + iso hip add + squat w ball behind back x20     kegel +   Transfer sit<>stand x10    Pt education: :  Incorporate pelvic brace with activities of daily living  Ther Ex:     Elliptical L5 5 min    Sit on ball-  Kegel +  U/LE lift  x20    scap retraction + row 15# with coordinated breathing and kegel 2x10        Standing-  Kegel + iso hip add + squat w ball behind back x20     kegel +   Transfer sit<>stand 3x10  w iso hip add    pelvic brace +  Lifting waist<>overhead 5# x15    Lifting 10# waist<>floor x15    Shuttle- DLP  37# with pelvic brace + iso hip adduction x30 reps      Ther Ex:     NuStep 6 min L5     Kegel + clamshell RTB x25 R/L     Kegel + articulating bridge x20 w kegel      Sit on ball-  Kegel +  U/LE lift  x20    scap retraction + row 15# with coordinated breathing and kegel 2x10        Standing-  Kegel + iso hip add + squat w ball behind back x20     kegel +   Jump  R/L x20  F/B x20  HEP       Shuttle- DLP  37# with pelvic brace + iso hip adduction x30 reps     [place] balance system-  Weight shift R/L   Platform 10-   x 3 min w pelvic floor muscle contraction/relaxation           HEP: Kegel 10 repetitions 3x/day, 10 sec on/10 sec off, 2 sec on 2-4 sec off ,  diaphragmatic breathing x10 , + additions above     Charges: TEx2, NM Re-edx1       Total Timed Treatment: 40 min  Total Treatment Time: 40 min

## 2022-10-07 ENCOUNTER — TELEPHONE (OUTPATIENT)
Dept: PHYSICAL THERAPY | Facility: HOSPITAL | Age: 52
End: 2022-10-07

## 2022-10-11 ENCOUNTER — OFFICE VISIT (OUTPATIENT)
Dept: UROLOGY | Facility: HOSPITAL | Age: 52
End: 2022-10-11
Attending: OBSTETRICS & GYNECOLOGY
Payer: MEDICAID

## 2022-10-11 VITALS — TEMPERATURE: 98 F | HEIGHT: 61 IN | WEIGHT: 140 LBS | BODY MASS INDEX: 26.43 KG/M2

## 2022-10-11 DIAGNOSIS — N95.2 VAGINAL ATROPHY: ICD-10-CM

## 2022-10-11 DIAGNOSIS — N39.3 STRESS INCONTINENCE: Primary | ICD-10-CM

## 2022-10-11 DIAGNOSIS — Z87.42 HISTORY OF DYSPAREUNIA IN FEMALE: ICD-10-CM

## 2022-10-11 PROCEDURE — 99212 OFFICE O/P EST SF 10 MIN: CPT

## 2022-10-11 RX ORDER — ESTRADIOL 0.1 MG/G
CREAM VAGINAL
Qty: 42.5 G | Refills: 3 | Status: SHIPPED | OUTPATIENT
Start: 2022-10-11

## 2022-10-12 ENCOUNTER — APPOINTMENT (OUTPATIENT)
Dept: PHYSICAL THERAPY | Facility: HOSPITAL | Age: 52
End: 2022-10-12
Attending: OBSTETRICS & GYNECOLOGY
Payer: MEDICAID

## 2022-10-18 ENCOUNTER — APPOINTMENT (OUTPATIENT)
Dept: PHYSICAL THERAPY | Facility: HOSPITAL | Age: 52
End: 2022-10-18
Attending: OBSTETRICS & GYNECOLOGY
Payer: MEDICAID

## 2022-10-25 ENCOUNTER — APPOINTMENT (OUTPATIENT)
Dept: PHYSICAL THERAPY | Facility: HOSPITAL | Age: 52
End: 2022-10-25
Attending: OBSTETRICS & GYNECOLOGY
Payer: MEDICAID

## 2022-11-10 ENCOUNTER — OFFICE VISIT (OUTPATIENT)
Dept: UROLOGY | Facility: HOSPITAL | Age: 52
End: 2022-11-10
Payer: MEDICAID

## 2022-11-10 VITALS — HEIGHT: 61 IN | WEIGHT: 140 LBS | BODY MASS INDEX: 26.43 KG/M2

## 2022-11-10 DIAGNOSIS — N39.3 STRESS INCONTINENCE: Primary | ICD-10-CM

## 2022-11-10 LAB
BLOOD URINE: NEGATIVE
CONTROL RUN WITHIN 24 HOURS?: YES
LEUKOCYTE ESTERASE URINE: NEGATIVE
NITRITE URINE: NEGATIVE

## 2022-11-10 PROCEDURE — 51797 INTRAABDOMINAL PRESSURE TEST: CPT

## 2022-11-10 PROCEDURE — 81002 URINALYSIS NONAUTO W/O SCOPE: CPT

## 2022-11-10 PROCEDURE — 51729 CYSTOMETROGRAM W/VP&UP: CPT

## 2022-11-10 PROCEDURE — 51784 ANAL/URINARY MUSCLE STUDY: CPT

## 2022-11-10 PROCEDURE — 51741 ELECTRO-UROFLOWMETRY FIRST: CPT

## 2022-12-20 ENCOUNTER — OFFICE VISIT (OUTPATIENT)
Dept: UROLOGY | Facility: HOSPITAL | Age: 52
End: 2022-12-20
Attending: OBSTETRICS & GYNECOLOGY
Payer: MEDICAID

## 2022-12-20 VITALS — RESPIRATION RATE: 16 BRPM | WEIGHT: 140 LBS | HEIGHT: 61 IN | BODY MASS INDEX: 26.43 KG/M2

## 2022-12-20 DIAGNOSIS — N95.2 VAGINAL ATROPHY: ICD-10-CM

## 2022-12-20 DIAGNOSIS — N39.3 STRESS INCONTINENCE: Primary | ICD-10-CM

## 2022-12-20 DIAGNOSIS — N94.10 DYSPAREUNIA, FEMALE: ICD-10-CM

## 2022-12-20 PROCEDURE — 99212 OFFICE O/P EST SF 10 MIN: CPT

## 2022-12-20 RX ORDER — OMEPRAZOLE 40 MG/1
CAPSULE, DELAYED RELEASE ORAL
COMMUNITY
Start: 2022-12-02

## 2023-02-06 ENCOUNTER — LAB ENCOUNTER (OUTPATIENT)
Dept: LAB | Facility: HOSPITAL | Age: 53
End: 2023-02-06
Attending: OBSTETRICS & GYNECOLOGY
Payer: MEDICAID

## 2023-02-06 DIAGNOSIS — Z01.818 PREOP TESTING: ICD-10-CM

## 2023-02-07 LAB — SARS-COV-2 RNA RESP QL NAA+PROBE: NOT DETECTED

## 2023-02-09 ENCOUNTER — HOSPITAL ENCOUNTER (OUTPATIENT)
Facility: HOSPITAL | Age: 53
Setting detail: HOSPITAL OUTPATIENT SURGERY
Discharge: HOME | End: 2023-02-09
Attending: OBSTETRICS & GYNECOLOGY | Admitting: OBSTETRICS & GYNECOLOGY
Payer: MEDICAID

## 2023-02-09 ENCOUNTER — ANESTHESIA EVENT (OUTPATIENT)
Dept: SURGERY | Facility: HOSPITAL | Age: 53
End: 2023-02-09
Payer: MEDICAID

## 2023-02-09 ENCOUNTER — ANESTHESIA (OUTPATIENT)
Dept: SURGERY | Facility: HOSPITAL | Age: 53
End: 2023-02-09
Payer: MEDICAID

## 2023-02-09 VITALS
HEIGHT: 61 IN | OXYGEN SATURATION: 98 % | DIASTOLIC BLOOD PRESSURE: 70 MMHG | RESPIRATION RATE: 16 BRPM | TEMPERATURE: 97 F | BODY MASS INDEX: 27.38 KG/M2 | HEART RATE: 59 BPM | SYSTOLIC BLOOD PRESSURE: 112 MMHG | WEIGHT: 145 LBS

## 2023-02-09 DIAGNOSIS — Z01.818 PREOP TESTING: Primary | ICD-10-CM

## 2023-02-09 PROCEDURE — 0TSD0ZZ REPOSITION URETHRA, OPEN APPROACH: ICD-10-PCS | Performed by: OBSTETRICS & GYNECOLOGY

## 2023-02-09 PROCEDURE — 88300 SURGICAL PATH GROSS: CPT | Performed by: OBSTETRICS & GYNECOLOGY

## 2023-02-09 PROCEDURE — 0TJB8ZZ INSPECTION OF BLADDER, VIA NATURAL OR ARTIFICIAL OPENING ENDOSCOPIC: ICD-10-PCS | Performed by: OBSTETRICS & GYNECOLOGY

## 2023-02-09 PROCEDURE — 0UBG7ZZ EXCISION OF VAGINA, VIA NATURAL OR ARTIFICIAL OPENING: ICD-10-PCS | Performed by: OBSTETRICS & GYNECOLOGY

## 2023-02-09 PROCEDURE — 0UCG7ZZ EXTIRPATION OF MATTER FROM VAGINA, VIA NATURAL OR ARTIFICIAL OPENING: ICD-10-PCS | Performed by: OBSTETRICS & GYNECOLOGY

## 2023-02-09 PROCEDURE — 88305 TISSUE EXAM BY PATHOLOGIST: CPT | Performed by: OBSTETRICS & GYNECOLOGY

## 2023-02-09 DEVICE — TRANSVAGINAL MID-URETHRAL SLING
Type: IMPLANTABLE DEVICE | Site: VAGINA | Status: FUNCTIONAL
Brand: ADVANTAGE FIT™  SYSTEM

## 2023-02-09 RX ORDER — KETOROLAC TROMETHAMINE 30 MG/ML
INJECTION, SOLUTION INTRAMUSCULAR; INTRAVENOUS AS NEEDED
Status: DISCONTINUED | OUTPATIENT
Start: 2023-02-09 | End: 2023-02-09 | Stop reason: SURG

## 2023-02-09 RX ORDER — HYDROCODONE BITARTRATE AND ACETAMINOPHEN 5; 325 MG/1; MG/1
1-2 TABLET ORAL EVERY 6 HOURS PRN
Qty: 15 TABLET | Refills: 0 | Status: SHIPPED | OUTPATIENT
Start: 2023-02-09

## 2023-02-09 RX ORDER — HYDROMORPHONE HYDROCHLORIDE 1 MG/ML
0.4 INJECTION, SOLUTION INTRAMUSCULAR; INTRAVENOUS; SUBCUTANEOUS EVERY 5 MIN PRN
Status: DISCONTINUED | OUTPATIENT
Start: 2023-02-09 | End: 2023-02-09

## 2023-02-09 RX ORDER — NALOXONE HYDROCHLORIDE 0.4 MG/ML
80 INJECTION, SOLUTION INTRAMUSCULAR; INTRAVENOUS; SUBCUTANEOUS AS NEEDED
Status: DISCONTINUED | OUTPATIENT
Start: 2023-02-09 | End: 2023-02-09

## 2023-02-09 RX ORDER — ONDANSETRON 2 MG/ML
INJECTION INTRAMUSCULAR; INTRAVENOUS AS NEEDED
Status: DISCONTINUED | OUTPATIENT
Start: 2023-02-09 | End: 2023-02-09 | Stop reason: SURG

## 2023-02-09 RX ORDER — PROCHLORPERAZINE EDISYLATE 5 MG/ML
5 INJECTION INTRAMUSCULAR; INTRAVENOUS EVERY 8 HOURS PRN
Status: DISCONTINUED | OUTPATIENT
Start: 2023-02-09 | End: 2023-02-09

## 2023-02-09 RX ORDER — MORPHINE SULFATE 4 MG/ML
2 INJECTION, SOLUTION INTRAMUSCULAR; INTRAVENOUS EVERY 10 MIN PRN
Status: DISCONTINUED | OUTPATIENT
Start: 2023-02-09 | End: 2023-02-09

## 2023-02-09 RX ORDER — SODIUM CHLORIDE, SODIUM LACTATE, POTASSIUM CHLORIDE, CALCIUM CHLORIDE 600; 310; 30; 20 MG/100ML; MG/100ML; MG/100ML; MG/100ML
INJECTION, SOLUTION INTRAVENOUS CONTINUOUS
Status: DISCONTINUED | OUTPATIENT
Start: 2023-02-09 | End: 2023-02-09

## 2023-02-09 RX ORDER — HYDROMORPHONE HYDROCHLORIDE 1 MG/ML
INJECTION, SOLUTION INTRAMUSCULAR; INTRAVENOUS; SUBCUTANEOUS AS NEEDED
Status: DISCONTINUED | OUTPATIENT
Start: 2023-02-09 | End: 2023-02-09 | Stop reason: SURG

## 2023-02-09 RX ORDER — MORPHINE SULFATE 10 MG/ML
6 INJECTION, SOLUTION INTRAMUSCULAR; INTRAVENOUS EVERY 10 MIN PRN
Status: DISCONTINUED | OUTPATIENT
Start: 2023-02-09 | End: 2023-02-09

## 2023-02-09 RX ORDER — HYDROCODONE BITARTRATE AND ACETAMINOPHEN 5; 325 MG/1; MG/1
1 TABLET ORAL ONCE
Status: COMPLETED | OUTPATIENT
Start: 2023-02-09 | End: 2023-02-09

## 2023-02-09 RX ORDER — BUPIVACAINE HYDROCHLORIDE AND EPINEPHRINE 2.5; 5 MG/ML; UG/ML
INJECTION, SOLUTION EPIDURAL; INFILTRATION; INTRACAUDAL; PERINEURAL AS NEEDED
Status: DISCONTINUED | OUTPATIENT
Start: 2023-02-09 | End: 2023-02-09 | Stop reason: HOSPADM

## 2023-02-09 RX ORDER — DEXAMETHASONE SODIUM PHOSPHATE 4 MG/ML
VIAL (ML) INJECTION AS NEEDED
Status: DISCONTINUED | OUTPATIENT
Start: 2023-02-09 | End: 2023-02-09 | Stop reason: SURG

## 2023-02-09 RX ORDER — LIDOCAINE HYDROCHLORIDE 10 MG/ML
INJECTION, SOLUTION EPIDURAL; INFILTRATION; INTRACAUDAL; PERINEURAL AS NEEDED
Status: DISCONTINUED | OUTPATIENT
Start: 2023-02-09 | End: 2023-02-09 | Stop reason: SURG

## 2023-02-09 RX ORDER — CEFAZOLIN SODIUM/WATER 2 G/20 ML
2 SYRINGE (ML) INTRAVENOUS ONCE
Status: COMPLETED | OUTPATIENT
Start: 2023-02-09 | End: 2023-02-09

## 2023-02-09 RX ORDER — HYDROMORPHONE HYDROCHLORIDE 1 MG/ML
0.6 INJECTION, SOLUTION INTRAMUSCULAR; INTRAVENOUS; SUBCUTANEOUS EVERY 5 MIN PRN
Status: DISCONTINUED | OUTPATIENT
Start: 2023-02-09 | End: 2023-02-09

## 2023-02-09 RX ORDER — ONDANSETRON 2 MG/ML
4 INJECTION INTRAMUSCULAR; INTRAVENOUS EVERY 6 HOURS PRN
Status: DISCONTINUED | OUTPATIENT
Start: 2023-02-09 | End: 2023-02-09

## 2023-02-09 RX ORDER — HYDROMORPHONE HYDROCHLORIDE 1 MG/ML
0.2 INJECTION, SOLUTION INTRAMUSCULAR; INTRAVENOUS; SUBCUTANEOUS EVERY 5 MIN PRN
Status: DISCONTINUED | OUTPATIENT
Start: 2023-02-09 | End: 2023-02-09

## 2023-02-09 RX ORDER — ACETAMINOPHEN 500 MG
1000 TABLET ORAL ONCE
Status: COMPLETED | OUTPATIENT
Start: 2023-02-09 | End: 2023-02-09

## 2023-02-09 RX ORDER — MORPHINE SULFATE 4 MG/ML
4 INJECTION, SOLUTION INTRAMUSCULAR; INTRAVENOUS EVERY 10 MIN PRN
Status: DISCONTINUED | OUTPATIENT
Start: 2023-02-09 | End: 2023-02-09

## 2023-02-09 RX ORDER — GLYCOPYRROLATE 0.2 MG/ML
INJECTION, SOLUTION INTRAMUSCULAR; INTRAVENOUS AS NEEDED
Status: DISCONTINUED | OUTPATIENT
Start: 2023-02-09 | End: 2023-02-09 | Stop reason: SURG

## 2023-02-09 RX ADMIN — LIDOCAINE HYDROCHLORIDE 30 MG: 10 INJECTION, SOLUTION EPIDURAL; INFILTRATION; INTRACAUDAL; PERINEURAL at 12:26:00

## 2023-02-09 RX ADMIN — HYDROMORPHONE HYDROCHLORIDE 0.2 MG: 1 INJECTION, SOLUTION INTRAMUSCULAR; INTRAVENOUS; SUBCUTANEOUS at 13:30:00

## 2023-02-09 RX ADMIN — SODIUM CHLORIDE, SODIUM LACTATE, POTASSIUM CHLORIDE, CALCIUM CHLORIDE: 600; 310; 30; 20 INJECTION, SOLUTION INTRAVENOUS at 13:44:00

## 2023-02-09 RX ADMIN — CEFAZOLIN SODIUM/WATER 2 G: 2 G/20 ML SYRINGE (ML) INTRAVENOUS at 12:32:00

## 2023-02-09 RX ADMIN — LIDOCAINE HYDROCHLORIDE 20 MG: 10 INJECTION, SOLUTION EPIDURAL; INFILTRATION; INTRACAUDAL; PERINEURAL at 12:27:00

## 2023-02-09 RX ADMIN — KETOROLAC TROMETHAMINE 30 MG: 30 INJECTION, SOLUTION INTRAMUSCULAR; INTRAVENOUS at 13:41:00

## 2023-02-09 RX ADMIN — DEXAMETHASONE SODIUM PHOSPHATE 4 MG: 4 MG/ML VIAL (ML) INJECTION at 12:27:00

## 2023-02-09 RX ADMIN — SODIUM CHLORIDE, SODIUM LACTATE, POTASSIUM CHLORIDE, CALCIUM CHLORIDE: 600; 310; 30; 20 INJECTION, SOLUTION INTRAVENOUS at 12:20:00

## 2023-02-09 RX ADMIN — GLYCOPYRROLATE 0.2 MG: 0.2 INJECTION, SOLUTION INTRAMUSCULAR; INTRAVENOUS at 12:27:00

## 2023-02-09 RX ADMIN — ONDANSETRON 4 MG: 2 INJECTION INTRAMUSCULAR; INTRAVENOUS at 12:27:00

## 2023-02-09 RX ADMIN — HYDROMORPHONE HYDROCHLORIDE 0.2 MG: 1 INJECTION, SOLUTION INTRAMUSCULAR; INTRAVENOUS; SUBCUTANEOUS at 13:35:00

## 2023-02-09 NOTE — ANESTHESIA PROCEDURE NOTES
Airway  Date/Time: 2/9/2023 12:28 PM  Urgency: Elective    Airway not difficult    General Information and Staff    Patient location during procedure: OR  Anesthesiologist: Ilan Altman MD  Resident/CRNA: Michaela Torres CRNA  Performed: anesthesiologist     Indications and Patient Condition  Indications for airway management: anesthesia  Spontaneous ventilation: present  Sedation level: deep  Preoxygenated: yes  Patient position: sniffing  Mask difficulty assessment: 1 - vent by mask    Final Airway Details  Final airway type: supraglottic airway      Successful airway: classic  Size 4  Cuff Pressure (cm H2O): 15  Airway Seal Pressure (cm H2O): 10       Number of attempts at approach: 1

## 2023-02-09 NOTE — BRIEF OP NOTE
Pre-Operative Diagnosis: Stress incontinence, dyspareunia     Post-Operative Diagnosis: Stress incontinence, dyspareunia, mesh exposure      Procedure Performed:   Revision of the previous sling arms, placement of mid-urethral sling cystoscopy    Surgeon(s) and Role:     Serenity Minaya MD - Primary     * Mey Nicole MD - Assisting Surgeon    Assistant(s):   Dr. Lexy Lopez     Surgical Findings: Mesh exposure and scar from previous transobturator sling. Normal bladder and urethra.       Specimen: Vaginal scar and mesh     Estimated Blood Loss: Blood Output: 50 mL (2/9/2023  1:43 PM)      Cristina Mohamud MD  2/9/2023  2:02 PM

## 2023-02-10 NOTE — OPERATIVE REPORT
Lashay Clancy  : 1970  MRN: [id number]  Date of Surgery: 2023             Pre-operative diagnosis:   1. Stress urinary incontinence  2. Dyspareunia  3. Status post previous sling procedure done elsewhere    Post-operative diagnosis:  1. Stress urinary incontinence  2. Dyspareunia  3. Status post previous sling procedure done elsewhere  4. Vaginal mesh exposure and scar    Procedures:  1. Excision of old vaginal mesh and vaginal scar. 2. Placement of new retropubic midurethral sling, cystoscopy. Surgeon: Braden Rutledge MD    Assistant: Silver Argueta MD      Location: [facility], OR     Anesthesia:  General, via LMA    EBL:  50 cc     Complications: None          Findings: Right and left vaginal sulcus scar with associated mesh exposure from previous transobturator sling. Normal bladder and urethra. Specimens: Vaginal mesh and scar    Drains:  None    Description of the procedure: The patient was brought to operating room 2, where she was identified, placed under a  general anesthetic, prepped and draped in lithotomy position, and surgical pause performed. Dr. Silver Argueta was utilized as a surgical assist for the entire procedure due to the need of tissue retraction, dissection of vital structures, prevention and management of blood loss and reduction in overall operative time and anesthesia time. A Magrina vaginal retractor was placed. The bladder was drained. Examination of the anterior vagina revealed a thick band for scar on right periurethral sulcus with a 1 mm area of mesh exposure. A similar area of scar was seen on the left periurethral sulcus with a similar area of mesh exposure. The midline appeared intact. The mesh appeared to be very distal and not consistent with a midurethral placement. There was mild anterior vaginal wall relaxation. The areas of scar were infiltrated with 0.25% Marcaine with epinephrine.   An elliptical incision was made on the right vaginal sulcus around the scar and mesh first. The vaginal epithelium was dissected from the underlying scar and the vaginal epithelium was mobilized circumferentially until the right mesh and scar were freed up and excised. A similar technique was carried on the left side. The mesh aggregate from the right and left were submitted to pathology. There was good vaginal mobility and no further scar. The incisions were irrigated and hemostasis was assured. The vaginal incisions were reapproximated with 2-0 Vicryl in a running fashion. Following this, the midurethral area was identified. 0.25% Marcaine with epinephrine was injected in the periurethral area bilaterally. A 2-cm incision was made in the midurethral area. The vaginal epithelium was dissected off the underlying periurethral tissue. We did not encounter the previous sling at this level, since as stated above, it was placed more distally. The Advantage fit trocar was passed from the vaginal side to the retropubic side bilaterally. Exit markings had been made on the skin previously. Cystoscopy was performed. Ureteral patency was confirmed. There was no evidence of urethral or intravesical lesion, foreign body or bladder perforation. The sling was then placed in the midurethra in a tension free fashion. There was no evidence of tunneling of the vaginal mucosa. The excess sling was trimmed, and the vaginal mucosa was closed in the midline with running 2-0 Vicryl suture. The suprapubic stab incisions were reapproximated with Dermabond. Hemostasis was assured. EBL was 50 mL. There were no complications. The patient was awakened from general anesthesia and transferred to the recovery room in stable condition.     Helen De La Rosa MD

## 2023-02-13 ENCOUNTER — OFFICE VISIT (OUTPATIENT)
Dept: UROLOGY | Facility: HOSPITAL | Age: 53
End: 2023-02-13
Payer: MEDICAID

## 2023-02-13 VITALS
TEMPERATURE: 99 F | SYSTOLIC BLOOD PRESSURE: 120 MMHG | BODY MASS INDEX: 27.38 KG/M2 | HEIGHT: 61 IN | DIASTOLIC BLOOD PRESSURE: 54 MMHG | RESPIRATION RATE: 18 BRPM | WEIGHT: 145 LBS

## 2023-02-13 DIAGNOSIS — R33.8 POSTOPERATIVE URINARY RETENTION: Primary | ICD-10-CM

## 2023-02-13 DIAGNOSIS — N99.89 POSTOPERATIVE URINARY RETENTION: Primary | ICD-10-CM

## 2023-02-13 PROCEDURE — 99212 OFFICE O/P EST SF 10 MIN: CPT

## 2023-02-13 NOTE — PATIENT INSTRUCTIONS
Voiding Trial Instructions  You have passed your voiding trial at 0730  Please make sure you are drinking some water today. You can take your Motrin to help with any swelling from the catheter. It is important to try and empty your bladder every two hours during the day. Try and empty again at 0930. If you are unable to empty, try and drink a glass of water and try again 30-60 minutes later. If you have been unable to empty your bladder by 1130, which is 4 hours after leaving the office, you will most likely be uncomfortable and you will need to come back into the office. If it is after 4pm, go to an urgent care or emergency room to have a catheter placed again. Please call our office at [phone] if you have any questions or concerns.

## 2023-02-13 NOTE — PROCEDURES
Pt called office with voiding update. Pt left message on RN line reporting that she has urinated several times since office visit.

## 2023-03-23 ENCOUNTER — OFFICE VISIT (OUTPATIENT)
Dept: UROLOGY | Facility: HOSPITAL | Age: 53
End: 2023-03-23
Attending: OBSTETRICS & GYNECOLOGY
Payer: MEDICAID

## 2023-03-23 VITALS — RESPIRATION RATE: 16 BRPM | BODY MASS INDEX: 27.38 KG/M2 | WEIGHT: 145 LBS | HEIGHT: 61 IN

## 2023-03-23 DIAGNOSIS — N39.41 URGE INCONTINENCE: Primary | ICD-10-CM

## 2023-03-23 DIAGNOSIS — N95.2 VAGINAL ATROPHY: ICD-10-CM

## 2023-03-23 PROCEDURE — 51701 INSERT BLADDER CATHETER: CPT

## 2023-03-23 PROCEDURE — 81002 URINALYSIS NONAUTO W/O SCOPE: CPT | Performed by: OBSTETRICS & GYNECOLOGY

## 2023-03-23 PROCEDURE — 87086 URINE CULTURE/COLONY COUNT: CPT | Performed by: OBSTETRICS & GYNECOLOGY

## 2023-03-23 PROCEDURE — 99212 OFFICE O/P EST SF 10 MIN: CPT

## 2023-03-23 RX ORDER — SOLIFENACIN SUCCINATE 5 MG/1
5 TABLET, FILM COATED ORAL DAILY
Qty: 30 TABLET | Refills: 3 | Status: SHIPPED | OUTPATIENT
Start: 2023-03-23

## 2023-04-24 ENCOUNTER — TELEPHONE (OUTPATIENT)
Dept: UROLOGY | Facility: HOSPITAL | Age: 53
End: 2023-04-24

## 2023-04-24 DIAGNOSIS — R30.0 DYSURIA: ICD-10-CM

## 2023-04-24 DIAGNOSIS — R35.0 URINARY FREQUENCY: Primary | ICD-10-CM

## 2023-04-24 DIAGNOSIS — R39.15 URGENCY OF URINATION: ICD-10-CM

## 2023-04-24 NOTE — TELEPHONE ENCOUNTER
Telephone call received from patient left message on RN line. Called      Patient at home and she  complains of UTI signs and symptoms including urgency, frequency, dysuria since Saturday evening. Denies fever    Allergies and previous cultures reviewed    Discussed with Dr Fred Bustos , order for urine culture     Urine culture ordered, will follow( patient will come to the [facility] tomorrow morning.      Encouraged PO hydration, AZO prn for pain     Discussed avoidance of bladder irritants such as alcohol, caffeine, carbonation    All questions answered     Office number provided [phone]    Patient understands and agrees to plan

## 2023-04-25 ENCOUNTER — TELEPHONE (OUTPATIENT)
Dept: UROLOGY | Facility: HOSPITAL | Age: 53
End: 2023-04-25

## 2023-04-25 ENCOUNTER — LAB ENCOUNTER (OUTPATIENT)
Dept: LAB | Facility: HOSPITAL | Age: 53
End: 2023-04-25
Attending: OBSTETRICS & GYNECOLOGY
Payer: MEDICAID

## 2023-04-25 DIAGNOSIS — R39.15 URGENCY OF URINATION: ICD-10-CM

## 2023-04-25 DIAGNOSIS — R35.0 URINARY FREQUENCY: ICD-10-CM

## 2023-04-25 DIAGNOSIS — R30.0 DYSURIA: ICD-10-CM

## 2023-04-25 DIAGNOSIS — R35.0 URINARY FREQUENCY: Primary | ICD-10-CM

## 2023-04-25 PROCEDURE — 87086 URINE CULTURE/COLONY COUNT: CPT

## 2023-04-25 RX ORDER — NITROFURANTOIN 25; 75 MG/1; MG/1
100 CAPSULE ORAL 2 TIMES DAILY
Qty: 14 CAPSULE | Refills: 0 | Status: SHIPPED | OUTPATIENT
Start: 2023-04-25 | End: 2023-05-02

## 2023-04-25 NOTE — TELEPHONE ENCOUNTER
Incoming telephone call received from patient on 04/24/2023. Patient complains of UTI signs and symptoms including urgency, frequency, dysuria x since Saturday.      Denies fever    Allergies and previous cultures reviewed    Discussed with Dr. Bj Padilla 100 mg PO bid x 7 days    Urine culture ordered, will follow    Empiric antibiotics sent to patient's preferred pharmacy    All questions answered    Encouraged to call if symptoms worsen or fail to improve     Office number provided [phone]    Patient understands and agrees to plan

## 2023-04-27 ENCOUNTER — TELEPHONE (OUTPATIENT)
Dept: UROLOGY | Facility: HOSPITAL | Age: 53
End: 2023-04-27

## 2023-04-27 NOTE — TELEPHONE ENCOUNTER
Telephone call to patient with test results    Ucx showed <10,000 CFU/ML Gram negative nayan    Allergies reviewed    Discussed with Dr. Gaurang Muñoz continue antibiotics as prescribed if feeling better.     All questions answered     Follow up as scheduled, sooner prn    Patient understands and agrees to plan

## 2023-06-27 ENCOUNTER — OFFICE VISIT (OUTPATIENT)
Dept: UROLOGY | Facility: HOSPITAL | Age: 53
End: 2023-06-27
Attending: OBSTETRICS & GYNECOLOGY
Payer: MEDICAID

## 2023-06-27 VITALS
SYSTOLIC BLOOD PRESSURE: 110 MMHG | DIASTOLIC BLOOD PRESSURE: 60 MMHG | HEIGHT: 61 IN | WEIGHT: 145 LBS | BODY MASS INDEX: 27.38 KG/M2

## 2023-06-27 DIAGNOSIS — N39.41 URGE INCONTINENCE: Primary | ICD-10-CM

## 2023-06-27 PROCEDURE — 99212 OFFICE O/P EST SF 10 MIN: CPT

## 2023-09-14 ENCOUNTER — TELEPHONE (OUTPATIENT)
Dept: UROLOGY | Facility: HOSPITAL | Age: 53
End: 2023-09-14

## 2023-10-14 DIAGNOSIS — Z87.42 HISTORY OF DYSPAREUNIA IN FEMALE: ICD-10-CM

## 2023-10-17 RX ORDER — ESTRADIOL 0.1 MG/G
CREAM VAGINAL
Qty: 42.5 G | Refills: 3 | Status: SHIPPED | OUTPATIENT
Start: 2023-10-17

## (undated) DEVICE — DRAPE SRG 131X112X63IN GYN URO

## (undated) DEVICE — STERILE WATER 1000ML BTL

## (undated) DEVICE — TRAP 4 CPTR CHMBR N EZ INLN

## (undated) DEVICE — Device: Brand: JELCO

## (undated) DEVICE — REM POLYHESIVE ADULT PATIENT RETURN ELECTRODE: Brand: VALLEYLAB

## (undated) DEVICE — Device: Brand: DEFENDO AIR/WATER/SUCTION AND BIOPSY VALVE

## (undated) DEVICE — MINOR GENERAL: Brand: MEDLINE INDUSTRIES, INC.

## (undated) DEVICE — FORCEP BIOPSY RJ4 LG CAP W/ND

## (undated) DEVICE — DRAPE,UNDRBUT,WHT GRAD PCH,CAPPORT,20/CS: Brand: MEDLINE

## (undated) DEVICE — ESOPHAGEAL BALLOON DILATATION CATHETER: Brand: CRE FIXED WIRE

## (undated) DEVICE — SYRINGE/GUAGE ASSEMBLY

## (undated) DEVICE — 3M™ RED DOT™ MONITORING ELECTRODE WITH FOAM TAPE AND STICKY GEL, 50/BAG, 20/CASE, 72/PLT 2570: Brand: RED DOT™

## (undated) DEVICE — STERILE SURGICAL LUBRICANT, METAL TUBE: Brand: SURGILUBE

## (undated) DEVICE — SOL H2O IRRIGATION 3000ML

## (undated) DEVICE — ENDOSCOPY PACK UPPER: Brand: MEDLINE INDUSTRIES, INC.

## (undated) DEVICE — SUT VICRYL 2-0 CT-2 J269H

## (undated) DEVICE — MEDI-VAC NON-CONDUCTIVE SUCTION TUBING: Brand: CARDINAL HEALTH

## (undated) DEVICE — TUBING CYSTO TUR DUAL

## (undated) DEVICE — 1200CC GUARDIAN II: Brand: GUARDIAN

## (undated) DEVICE — CLOSURE EXOFIN 1.0ML

## (undated) DEVICE — CATH BALLOON CRE 15-18MM 5837

## (undated) DEVICE — GAMMEX® PI HYBRID SIZE 6.5, STERILE POWDER-FREE SURGICAL GLOVE, POLYISOPRENE AND NEOPRENE BLEND: Brand: GAMMEX

## (undated) DEVICE — CATH BALLOON CRE 18-20MM 5838

## (undated) DEVICE — GAMMEX® PI HYBRID SIZE 8, STERILE POWDER-FREE SURGICAL GLOVE, POLYISOPRENE AND NEOPRENE BLEND: Brand: GAMMEX

## (undated) DEVICE — SOL NACL IRRIG 0.9% 1000ML BTL

## (undated) DEVICE — FILTERLINE NASAL ADULT O2/CO2

## (undated) DEVICE — LONE STAR 5MM HOOKS LRG STAYS

## (undated) DEVICE — Device: Brand: SPOT EX ENDOSCOPIC TATTOO

## (undated) DEVICE — SLEEVE KENDALL SCD EXPRESS MED

## (undated) DEVICE — GLOVE SURG SENSICARE SZ 7

## (undated) DEVICE — NEEDLE CONTRAST INTERJECT 25G

## (undated) DEVICE — CAPSULE BRAVO PH

## (undated) DEVICE — SKIN PREP TRAY 4 COMPARTM TRAY: Brand: MEDLINE INDUSTRIES, INC.

## (undated) NOTE — LETTER
[facility]  Imelda Frey 59 [address]    Consent for Operation    Date: __________________    Time: _______________    1.  I authorize the performance upon Kevin Cerrato the following operation:    Procedure(s):  ESOPHAGOGASTRODUODE procedure has been videotaped, the surgeon will obtain the original videotape. The hospital will not be responsible for storage or maintenance of this tape.     6. For the purpose of advancing medical education, I consent to the admittance of observers to t STATEMENTS REQUIRING INSERTION OR COMPLETION WERE FILLED IN.     Signature of Patient:   ___________________________    When the patient is a minor or mentally incompetent to give consent:  Signature of person authorized to consent for patient: ____________ drugs/illegal medications). Failure to inform my anesthesiologist about these medicines may increase my risk of anesthetic complications. · If I am allergic to anything or have had a reaction to anesthesia before.     3. I understand how the anesthesia med I have discussed the procedure and information above with the patient (or patient’s representative) and answered their questions. The patient or their representative has agreed to have anesthesia services.     _______________________________________________

## (undated) NOTE — MR AVS SNAPSHOT
[place]  [address] [facility] [address]  [address] [facility] [address] [phone]               Thank you for choosing us for your health care visit with SHARONA Kelly.   We are glad to serve you and happy to provide you with this justice Phone:  [phone]   Fax:  [phone]         Referral Orders      Normal Orders This Visit    [facility] - INTERNAL [[id number] CUSTOM]  Order #:  [id number]         **REFERRAL REQUEST**    Your physician has referred you to a specialist.  Your physician or No              Current Medications          This list is accurate as of: 6/23/17  9:59 AM.  Always use your most recent med list.                Fiber 625 MG Tabs   Take by mouth.            hydrocortisone 2.5 % Crea   Apply rectally twice